# Patient Record
Sex: FEMALE | Race: AMERICAN INDIAN OR ALASKA NATIVE | HISPANIC OR LATINO | Employment: FULL TIME | URBAN - METROPOLITAN AREA
[De-identification: names, ages, dates, MRNs, and addresses within clinical notes are randomized per-mention and may not be internally consistent; named-entity substitution may affect disease eponyms.]

---

## 2017-11-20 DIAGNOSIS — B00.9 RECURRENT HERPES SIMPLEX: ICD-10-CM

## 2017-11-20 NOTE — TELEPHONE ENCOUNTER
According to protocol, not up to date on labs. Routing to provider.,        VALACYCLOVIR HCL 1GM TABS  Will file in chart as: valACYclovir (VALTREX) 1000 mg tablet  TAKE 1 TABLET BY MOUTH THREE TIMES A DAY       Disp: 21 tablet Refills: 1    Class: E-Prescribe Start: 11/20/2017   For: Recurrent herpes simplex  Originally ordered: 6 years ago by Jamison Conn MD  Last refill:3/16/2017  Antivirals for Herpes Protocol [id number] 3:06 PM   Recent or future visit with authorizing provider's specialty    Normal serum creatinine on file in past 12 months    Patient is age 12 or older           Angi Black RN

## 2017-11-21 RX ORDER — VALACYCLOVIR HYDROCHLORIDE 1 G/1
TABLET, FILM COATED ORAL
Qty: 21 TABLET | Refills: 0 | Status: SHIPPED | OUTPATIENT
Start: 2017-11-21 | End: 2017-11-30

## 2017-11-21 NOTE — TELEPHONE ENCOUNTER
Please let Analilia know - she needs an appt before any more refills.  I would suggest making an appt when she is well - either with me or with an adult provider so that she does not need medication urgently.    LUZ MARINA ZARATE MD

## 2017-11-21 NOTE — TELEPHONE ENCOUNTER
LMOM for patient or parent to call back for message from MD regarding a refill request we received from the pharmacy, or to give us permission to leave a detailed message on answering machine.  Conor Prajapati RN

## 2017-11-22 NOTE — TELEPHONE ENCOUNTER
I reached Analilia and relayed the message from Dr Cortes, below.   She says she understands and would like to schedule RHM visit with Dr Cortes and then may discuss transitioning to adult MD.   Appt scheduled for 11/30/2017.    She is a little uncertain about her insurance coverage.  We have Medicaid [place] Restricted.   I asked her to call her insurance to make sure that she is still able to come here and see Dr Cortes.  She says she will do that.    Conor Prajapati RN

## 2017-11-30 ENCOUNTER — TELEPHONE (OUTPATIENT)
Dept: PEDIATRICS | Facility: CLINIC | Age: 22
End: 2017-11-30

## 2017-11-30 ENCOUNTER — OFFICE VISIT (OUTPATIENT)
Dept: PEDIATRICS | Facility: CLINIC | Age: 22
End: 2017-11-30
Payer: MEDICAID

## 2017-11-30 VITALS
DIASTOLIC BLOOD PRESSURE: 65 MMHG | WEIGHT: 138.5 LBS | TEMPERATURE: 97.5 F | HEIGHT: 65 IN | HEART RATE: 72 BPM | BODY MASS INDEX: 23.07 KG/M2 | SYSTOLIC BLOOD PRESSURE: 107 MMHG

## 2017-11-30 DIAGNOSIS — B00.9 RECURRENT HERPES SIMPLEX: ICD-10-CM

## 2017-11-30 DIAGNOSIS — Z00.00 ROUTINE GENERAL MEDICAL EXAMINATION AT A HEALTH CARE FACILITY: Primary | ICD-10-CM

## 2017-11-30 PROCEDURE — 99395 PREV VISIT EST AGE 18-39: CPT | Performed by: PEDIATRICS

## 2017-11-30 RX ORDER — VALACYCLOVIR HYDROCHLORIDE 1 G/1
TABLET, FILM COATED ORAL
Qty: 21 TABLET | Refills: 3 | Status: SHIPPED | OUTPATIENT
Start: 2017-11-30 | End: 2022-05-26

## 2017-11-30 NOTE — PROGRESS NOTES
Cardiac risk assessment:     Family history (males <55, females <65) of angina (chest pain), heart attack, heart surgery for clogged arteries, or stroke: no    Biological parent(s) with a total cholesterol over 240:  no    VISION   No corrective lenses (H Plus Lens Screening required)  Tool used: House  Right eye: 10/50 (20/100)  Left eye: 10/10 (20/20)  Two Line Difference: YES  Visual Acuity: REFER  H Plus Lens Screening: Pass  Vision Assessment: abnormal--refer to eye clinic          HEARING    Right Ear:     1000 Hz:  Conditioning sound at 40 dB:  yes  (Conditioning sound)  1000 Hz: RESPONSE- on Level:   20 db   2000 Hz: RESPONSE- on Level:   20 db   4000 Hz: RESPONSE- on Level:   20 db   6000 Hz: RESPONSE- on Level:   20 db   (11 years and up only)    Left Ear:     6000 Hz: RESPONSE- on Level:   20 db   (11 years and up only)  4000 Hz: RESPONSE- on Level:   20 db   2000 Hz: RESPONSE- on Level:   20 db   1000 Hz: RESPONSE- on Level:   20 db   500 Hz: RESPONSE- on Level:   20 db     Right Ear:  500 Hz: RESPONSE- on Level:   20 db       Question Validity: no  Hearing Assessment: normal    QUESTIONS/CONCERNS: None    MENSTRUAL HISTORY  Normal        ============================================================    PROBLEM LISTPatient Active Problem List   Diagnosis     Atopic dermatitis     Recurrent herpes simplex on low back     MEDICATIONS  Current Outpatient Prescriptions   Medication Sig Dispense Refill     valACYclovir (VALTREX) 1000 mg tablet TAKE 1 TABLET BY MOUTH THREE TIMES A DAY 21 tablet 0     multivitamin, therapeutic with minerals (MULTI-VITAMIN) TABS Take 1 tablet by mouth daily. (Patient not taking: Reported on 11/30/2017) 100 tablet 3      ALLERGY  No Known Allergies    IMMUNIZATIONS  Immunization History   Administered Date(s) Administered     DTAP (<7y) 1997, 1997, 10/09/1998, 08/28/2001     HEPA 06/06/2013     HIB 1995, 1995, 10/19/1998     HPV 09/08/2009, 11/11/2009,  "03/29/2010     HepB 10/19/1998, 01/04/1999, 07/20/1999     Influenza (IIV3) PF 10/23/2008, 11/11/2009, 11/20/2010, 09/26/2011, 09/12/2012     Influenza Vaccine IM 3yrs+ 4 Valent IIV4 10/18/2013     MMR 06/28/1998, 07/31/2002     Mantoux 02/09/1998     Meningococcal (Menactra ) 10/23/2008, 06/06/2013     OPV, trivalent, live 10/19/1998     Poliovirus, inactivated (IPV) 1997, 1997, 08/28/2001     TDAP Vaccine (Boostrix) 10/23/2008     Tetramune (DtP/HIB) 1997     Varicella Immunity: Titer/MD Dx 06/06/2013     Varicella Pt Report Hx of Varicella/Chicken Pox 01/30/1998       HEALTH HISTORY SINCE LAST VISIT  No surgery, major illness or injury since last physical exam    Herpes outbreaks on low back 3-4 times per year.  Improves with valtrex - shorter course and less pain.    DRUGS  Smoking:  no  Passive smoke exposure:  no  Alcohol:  no  Drugs:  no    SEXUALITY  Sexual attraction:  opposite sex  Sexual activity: No      ROS  GENERAL: See health history, nutrition and daily activities   SKIN: No  rash, hives or significant lesions  HEENT: Hearing/vision: see above.  No eye, nasal, ear symptoms.  RESP: No cough or other concerns  CV: No concerns  GI: See nutrition and elimination.  No concerns.  : See elimination. No concerns  NEURO: No headaches or concerns.    OBJECTIVE:   EXAM  /65  Pulse 72  Temp 97.5  F (36.4  C)  Ht 5' 4.65\" (1.642 m)  Wt 138 lb 8 oz (62.8 kg)  LMP 11/24/2017 (Exact Date)  BMI 23.3 kg/m2  Normalized stature-for-age data not available for patients older than 20 years.  Normalized weight-for-age data not available for patients older than 20 years.  Normalized BMI data available only for age 0 to 20 years.  Normalized stature-for-age data not available for patients older than 20 years.  GENERAL: Active, alert, in no acute distress.  SKIN: Clear. No significant rash, abnormal pigmentation or lesions  HEAD: Normocephalic  EYES: Pupils equal, round, reactive, Extraocular " muscles intact. Normal conjunctivae.  EARS: Normal canals. Tympanic membranes are normal; gray and translucent.  NOSE: Normal without discharge.  MOUTH/THROAT: Clear. No oral lesions. Teeth without obvious abnormalities.  NECK: Supple, no masses.  No thyromegaly.  LYMPH NODES: No adenopathy  LUNGS: Clear. No rales, rhonchi, wheezing or retractions  HEART: Regular rhythm. Normal S1/S2. No murmurs. Normal pulses.  ABDOMEN: Soft, non-tender, not distended, no masses or hepatosplenomegaly. Bowel sounds normal.   NEUROLOGIC: No focal findings. Cranial nerves grossly intact: DTR's normal. Normal gait, strength and tone  BACK: Spine is straight, no scoliosis.  EXTREMITIES: Full range of motion, no deformities  : Exam deferred.    ASSESSMENT/PLAN:   (Z00.129) Encounter for routine child health examination without abnormal findings  (primary encounter diagnosis)  Plan: Healthy young adult.  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      (B00.9) Recurrent herpes simplex on low back  Plan: valACYclovir (VALTREX) 1000 mg tablet        Refills given.  If too frequent of breakouts (>4 per year), consider further evaluation.        Anticipatory Guidance  The following topics were discussed:  SOCIAL/ FAMILY:    Parent/ teen communication    TV/ media    School/ homework    Transition to adult care provider  NUTRITION:    Healthy food choices    Weight management  HEALTH / SAFETY:    Adequate sleep/ exercise    Drugs, ETOH, smoking    Body image    Seat belts    Teen   SEXUALITY:    Menstruation    Dating/ relationships    Encourage abstinence    Contraception     Safe sex/ STDs    Preventive Care Plan  Immunizations    Reviewed, up to date - declines flu vaccine.  Referrals/Ongoing Specialty care: No   See other orders in [facility].  Cleared for sports:  Not addressed  BMI at Normalized BMI data available only for age 0 to 20 years.  No weight concerns.  Dyslipidemia risk:    None  Dental visit  recommended: Yes       FOLLOW-UP:    in 1 year for a Preventive Care visit    Resources  HPV and Cancer Prevention:  What Parents Should Know  What Kids Should Know About HPV and Cancer  Goal Tracker: Be More Active  Goal Tracker: Less Screen Time  Goal Tracker: Drink More Water  Goal Tracker: Eat More Fruits and Veggies    LUZ MARINA ZARATE MD  [facility]

## 2017-11-30 NOTE — MR AVS SNAPSHOT
"              After Visit Summary   11/30/2017    Analilia Mora    MRN: [id number]           Patient Information     Date Of Birth          1995        Visit Information        Provider Department      11/30/2017 10:40 AM Manjula Cortes MD [facility]        Today's Diagnoses     Encounter for routine child health examination without abnormal findings    -  1    Recurrent herpes simplex on low back          Care Instructions      Preventive Care at the 15 - 18 Year Visit    Growth Percentiles & Measurements   Weight: 138 lbs 8 oz / 62.8 kg (actual weight) / Normalized weight-for-age data not available for patients older than 20 years.   Length: 5' 4.646\" / 164.2 cm Normalized stature-for-age data not available for patients older than 20 years.   BMI: Body mass index is 23.3 kg/(m^2). Normalized BMI data available only for age 0 to 20 years.   Blood Pressure: Normalized stature-for-age data not available for patients older than 20 years.    Next Visit    Continue to see your health care provider every year for preventive care.    Nutrition    It s very important to eat breakfast. This will help you make it through the morning.    Sit down with your family for a meal on a regular basis.    Eat healthy meals and snacks, including fruits and vegetables. Avoid salty and sugary snack foods.    Be sure to eat foods that are high in calcium and iron.    Avoid or limit caffeine (often found in soda pop).    Sleeping    Your body needs about 9 hours of sleep each night.    Keep screens (TV, computer, and video) out of the bedroom / sleeping area.  They can lead to poor sleep habits and increased obesity.    Health    Limit TV, computer and video time.    Set a goal to be physically fit.  Do some form of exercise every day.  It can be an active sport like skating, running, swimming, a team sport, etc.    Try to get 30 to 60 minutes of exercise at least three times a week.    Make healthy " choices: don t smoke or drink alcohol; don t use drugs.    In your teen years, you can expect . . .    To develop or strengthen hobbies.    To build strong friendships.    To be more responsible for yourself and your actions.    To be more independent.    To set more goals for yourself.    To use words that best express your thoughts and feelings.    To develop self-confidence and a sense of self.    To make choices about your education and future career.    To see big differences in how you and your friends grow and develop.    To have body odor from perspiration (sweating).  Use underarm deodorant each day.    To have some acne, sometimes or all the time.  (Talk with your doctor or nurse about this.)    Most girls have finished going through puberty by 15 to 16 years. Often, boys are still growing and building muscle mass.    Sexuality    It is normal to have sexual feelings.    Find a supportive person who can answer questions about puberty, sexual development, sex, abstinence (choosing not to have sex), sexually transmitted diseases (STDs) and birth control.    Think about how you can say no to sex.    Safety    Accidents are the greatest threat to your health and life.    Avoid dangerous behaviors and situations.  For example, never drive after drinking or using drugs.  Never get in a car if the  has been drinking or using drugs.    Always wear a seat belt in the car.  When you drive, make it a rule for all passengers to wear seat belts, too.    Stay within the speed limit and avoid distractions.    Practice a fire escape plan at home. Check smoke detector batteries twice a year.    Keep electric items (like blow dryers, razors, curling irons, etc.) away from water.    Wear a helmet and other protective gear when bike riding, skating, skateboarding, etc.    Use sunscreen to reduce your risk of skin cancer.    Learn first aid and CPR (cardiopulmonary resuscitation).    Avoid peers who try to pressure you  into risky activities.    Learn skills to manage stress, anger and conflict.    Do not use or carry any kind of weapon.    Find a supportive person (teacher, parent, health provider, counselor) whom you can talk to when you feel sad, angry, lonely or like hurting yourself.    Find help if you are being abused physically or sexually, or if you fear being hurt by others.    As a teenager, you will be given more responsibility for your health and health care decisions.  While your parent or guardian still has an important role, you will likely start spending some time alone with your health care provider as you get older.  Some teen health issues are actually considered confidential, and are protected by law.  Your health care team will discuss this and what it means with you.  Our goal is for you to become comfortable and confident caring for your own health.  ================================================================          Follow-ups after your visit        Who to contact     If you have questions or need follow up information about today's clinic visit or your schedule please contact [facility] directly at [phone].  Normal or non-critical lab and imaging results will be communicated to you by ShareWithUhart, letter or phone within 4 business days after the clinic has received the results. If you do not hear from us within 7 days, please contact the clinic through MyChart or phone. If you have a critical or abnormal lab result, we will notify you by phone as soon as possible.  Submit refill requests through [employer] or call your pharmacy and they will forward the refill request to us. Please allow 3 business days for your refill to be completed.          Additional Information About Your Visit        [employer] Information     [employer] lets you send messages to your doctor, view your test results, renew your prescriptions, schedule appointments and more. To sign up, go to  [email or web address] . Click on \"Log in\" on the left side of the screen, which will take you to the Welcome page. Then click on \"Sign up Now\" on the right side of the page.     You will be asked to enter the access code listed below, as well as some personal information. Please follow the directions to create your username and password.     Your access code is: [email or web address]  Expires: 2018 10:53 AM     Your access code will  in 90 days. If you need help or a new code, please call your [place] clinic or [phone].        Care EveryWhere ID     This is your Care EveryWhere ID. This could be used by other organizations to access your [place] medical records  [id number]        Your Vitals Were     Pulse Temperature Height Last Period BMI (Body Mass Index)       72 97.5  F (36.4  C) 5' 4.65\" (1.642 m) 2017 (Exact Date) 23.3 kg/m2        Blood Pressure from Last 3 Encounters:   17 107/65   16 106/68   02/04/15 129/69    Weight from Last 3 Encounters:   17 138 lb 8 oz (62.8 kg)   16 138 lb (62.6 kg) (69 %)*   02/03/15 130 lb (59 kg) (63 %)*     * Growth percentiles are based on [facility] 2-20 Years data.              Today, you had the following     No orders found for display         Today's Medication Changes          These changes are accurate as of: 17 10:54 AM.  If you have any questions, ask your nurse or doctor.               These medicines have changed or have updated prescriptions.        Dose/Directions    valACYclovir 1000 mg tablet   Commonly known as:  VALTREX   This may have changed:  See the new instructions.   Used for:  Recurrent herpes simplex   Changed by:  Manjula Cortes MD        TAKE 1 TABLET BY MOUTH THREE TIMES A DAY   Quantity:  21 tablet   Refills:  3            Where to get your medicines      These medications were sent to [facility] [place], [place] - [address] [place] [address] [address]  [address] [place] [address] [address] [facility] " [id number]     Phone:  [phone]     valACYclovir 1000 mg tablet                Primary Care Provider Office Phone # Fax #    Manjula Cortes [phone] [address] [facility] [address]        Equal Access to Services     OSORIO ROSIE : Hadii aad ku hadasho Soomaali, waaxda luqadaha, qaybta kaalmada adeegyada, waxay dungin hayalexisn adeangela garza smooth squires. So [facility] [phone].    ATENCIÓN: Si habla español, tiene a king disposición servicios gratuitos de asistencia lingüística. Llame al [phone].    We comply with applicable federal civil rights laws and [place] laws. We do not discriminate on the basis of race, color, national origin, age, disability, sex, sexual orientation, or gender identity.            Thank you!     Thank you for choosing [facility]  for your care. Our goal is always to provide you with excellent care. Hearing back from our patients is one way we can continue to improve our services. Please take a few minutes to complete the written survey that you may receive in the mail after your visit with us. Thank you!             Your Updated Medication List - Protect others around you: Learn how to safely use, store and throw away your medicines at www.disposemymeds.org.          This list is accurate as of: 11/30/17 10:54 AM.  Always use your most recent med list.                   Brand Name Dispense Instructions for use Diagnosis    multivitamin, therapeutic with minerals Tabs tablet     100 tablet    Take 1 tablet by mouth daily.    Routine infant or child health check       valACYclovir 1000 mg tablet    VALTREX    21 tablet    TAKE 1 TABLET BY MOUTH THREE TIMES A DAY    Recurrent herpes simplex

## 2017-11-30 NOTE — PATIENT INSTRUCTIONS
"  Preventive Care at the 15 - 18 Year Visit    Growth Percentiles & Measurements   Weight: 138 lbs 8 oz / 62.8 kg (actual weight) / Normalized weight-for-age data not available for patients older than 20 years.   Length: 5' 4.646\" / 164.2 cm Normalized stature-for-age data not available for patients older than 20 years.   BMI: Body mass index is 23.3 kg/(m^2). Normalized BMI data available only for age 0 to 20 years.   Blood Pressure: Normalized stature-for-age data not available for patients older than 20 years.    Next Visit    Continue to see your health care provider every year for preventive care.    Nutrition    It s very important to eat breakfast. This will help you make it through the morning.    Sit down with your family for a meal on a regular basis.    Eat healthy meals and snacks, including fruits and vegetables. Avoid salty and sugary snack foods.    Be sure to eat foods that are high in calcium and iron.    Avoid or limit caffeine (often found in soda pop).    Sleeping    Your body needs about 9 hours of sleep each night.    Keep screens (TV, computer, and video) out of the bedroom / sleeping area.  They can lead to poor sleep habits and increased obesity.    Health    Limit TV, computer and video time.    Set a goal to be physically fit.  Do some form of exercise every day.  It can be an active sport like skating, running, swimming, a team sport, etc.    Try to get 30 to 60 minutes of exercise at least three times a week.    Make healthy choices: don t smoke or drink alcohol; don t use drugs.    In your teen years, you can expect . . .    To develop or strengthen hobbies.    To build strong friendships.    To be more responsible for yourself and your actions.    To be more independent.    To set more goals for yourself.    To use words that best express your thoughts and feelings.    To develop self-confidence and a sense of self.    To make choices about your education and future career.    To see " big differences in how you and your friends grow and develop.    To have body odor from perspiration (sweating).  Use underarm deodorant each day.    To have some acne, sometimes or all the time.  (Talk with your doctor or nurse about this.)    Most girls have finished going through puberty by 15 to 16 years. Often, boys are still growing and building muscle mass.    Sexuality    It is normal to have sexual feelings.    Find a supportive person who can answer questions about puberty, sexual development, sex, abstinence (choosing not to have sex), sexually transmitted diseases (STDs) and birth control.    Think about how you can say no to sex.    Safety    Accidents are the greatest threat to your health and life.    Avoid dangerous behaviors and situations.  For example, never drive after drinking or using drugs.  Never get in a car if the  has been drinking or using drugs.    Always wear a seat belt in the car.  When you drive, make it a rule for all passengers to wear seat belts, too.    Stay within the speed limit and avoid distractions.    Practice a fire escape plan at home. Check smoke detector batteries twice a year.    Keep electric items (like blow dryers, razors, curling irons, etc.) away from water.    Wear a helmet and other protective gear when bike riding, skating, skateboarding, etc.    Use sunscreen to reduce your risk of skin cancer.    Learn first aid and CPR (cardiopulmonary resuscitation).    Avoid peers who try to pressure you into risky activities.    Learn skills to manage stress, anger and conflict.    Do not use or carry any kind of weapon.    Find a supportive person (teacher, parent, health provider, counselor) whom you can talk to when you feel sad, angry, lonely or like hurting yourself.    Find help if you are being abused physically or sexually, or if you fear being hurt by others.    As a teenager, you will be given more responsibility for your health and health care decisions.   While your parent or guardian still has an important role, you will likely start spending some time alone with your health care provider as you get older.  Some teen health issues are actually considered confidential, and are protected by law.  Your health care team will discuss this and what it means with you.  Our goal is for you to become comfortable and confident caring for your own health.  ================================================================

## 2018-01-05 ENCOUNTER — OFFICE VISIT (OUTPATIENT)
Dept: OPHTHALMOLOGY | Facility: CLINIC | Age: 23
End: 2018-01-05
Payer: COMMERCIAL

## 2018-01-05 DIAGNOSIS — H52.13 MYOPIA OF BOTH EYES: Primary | ICD-10-CM

## 2018-01-05 ASSESSMENT — CONF VISUAL FIELD
OD_NORMAL: 1
OS_NORMAL: 1
METHOD: COUNTING FINGERS

## 2018-01-05 ASSESSMENT — VISUAL ACUITY
OD_SC: 20/200
OD_PH_SC: 20/40-
OS_SC+: +2
OS_SC: J1+
METHOD: SNELLEN - LINEAR
OS_SC: 20/30
OD_SC: J1+

## 2018-01-05 ASSESSMENT — SLIT LAMP EXAM - LIDS
COMMENTS: NORMAL
COMMENTS: NORMAL

## 2018-01-05 ASSESSMENT — REFRACTION
OS_AXIS: 135
OD_SPHERE: -1.75
OS_SPHERE: -0.75
OS_SPHERE: -0.50
OS_CYLINDER: +0.25
OD_CYLINDER: SPHERE
OD_SPHERE: -1.75
OD_CYLINDER: +0.50
OS_CYLINDER: +0.75
OS_AXIS: 100
OD_AXIS: 090

## 2018-01-05 ASSESSMENT — EXTERNAL EXAM - LEFT EYE: OS_EXAM: NORMAL

## 2018-01-05 ASSESSMENT — CUP TO DISC RATIO
OS_RATIO: 0.5
OD_RATIO: 0.5

## 2018-01-05 ASSESSMENT — TONOMETRY
OD_IOP_MMHG: 14
IOP_METHOD: ICARE
OS_IOP_MMHG: 15

## 2018-01-05 ASSESSMENT — REFRACTION_MANIFEST
OD_CYLINDER: SPHERE
OS_SPHERE: -1.00
OD_SPHERE: -1.75
OS_CYLINDER: SPHERE

## 2018-01-05 ASSESSMENT — EXTERNAL EXAM - RIGHT EYE: OD_EXAM: NORMAL

## 2018-01-05 NOTE — PROGRESS NOTES
Assessment/Plan  (H52.13) Myopia of both eyes  (primary encounter diagnosis)  Comment: Myopia OU  Plan: REFRACTION [[id number]]         Educated patient on condition and clinical findings. Dispensed spectacle prescription for full time wear. Educated patient on possibility of adaptation period, if symptoms do not improve return to clinic for further testing.   Discussed lenses at length given this is a first time glasses prescription.    Return to clinic in 1 year for comprehensive eye exam.    Complete documentation of historical and exam elements from today's encounter can  be found in the full encounter summary report (not reduplicated in this progress  note). I personally obtained the chief complaint(s) and history of present illness. I  confirmed and edited as necessary the review of systems, past medical/surgical  history, family history, social history, and examination findings as documented by  others; and I examined the patient myself. I personally reviewed the relevant tests,  images, and reports as documented above. I formulated and edited as necessary the  assessment and plan and discussed the findings and management plan with the  patient and family.    Daniel Beckford, OD, FAAO

## 2018-01-05 NOTE — MR AVS SNAPSHOT
After Visit Summary   2018    Analilia Mora    MRN: [id number]           Patient Information     Date Of Birth          1995        Visit Information        Provider Department      2018 8:00 AM Daniel Beckford, [facility]        Today's Diagnoses     Myopia of both eyes    -  1       Follow-ups after your visit        Follow-up notes from your care team     Return in about 1 year (around 2019) for Comprehensive Eye Exam.      Who to contact     Please call your clinic at [phone] to:    Ask questions about your health    Make or cancel appointments    Discuss your medicines    Learn about your test results    Speak to your doctor   If you have compliments or concerns about an experience at your clinic, or if you wish to file a complaint, please contact [facility] Patient Relations at [phone] or email us at [email or web address]         Additional Information About Your Visit        MyChart Information     [employer] is an electronic gateway that provides easy, online access to your medical records. With [employer], you can request a clinic appointment, read your test results, renew a prescription or communicate with your care team.     To sign up for [employer] visit the website at [email or web address]   You will be asked to enter the access code listed below, as well as some personal information. Please follow the directions to create your username and password.     Your access code is: [email or web address]  Expires: 2018 10:53 AM     Your access code will  in 90 days. If you need help or a new code, please contact your [facility] or call [phone] for assistance.        Care EveryWhere ID     This is your Care EveryWhere ID. This could be used by other organizations to access your [place] medical records  [id number]         Blood Pressure from Last 3 Encounters:   17  107/65   08/03/16 106/68   02/04/15 129/69    Weight from Last 3 Encounters:   11/30/17 62.8 kg (138 lb 8 oz)   08/03/16 62.6 kg (138 lb) (69 %)*   02/03/15 59 kg (130 lb) (63 %)*     * Growth percentiles are based on [facility] 2-20 Years data.              We Performed the Following     REFRACTION [[id number]]        Primary Care Provider Office Phone # Fax #    Manjula Cortes [phone] [address] [facility] [address]        Equal Access to Services     [facility]: Hadii aad ku hadasho Soomaali, waaxda luqadaha, qaybta kaalmada adeangelayada, lilian rebollar . So [facility] [phone].    ATENCIÓN: Si habla español, tiene a king disposición servicios gratuitos de asistencia lingüística. Llame al [phone].    We comply with applicable federal civil rights laws and [place] laws. We do not discriminate on the basis of race, color, national origin, age, disability, sex, sexual orientation, or gender identity.            Thank you!     Thank you for choosing [facility]  for your care. Our goal is always to provide you with excellent care. Hearing back from our patients is one way we can continue to improve our services. Please take a few minutes to complete the written survey that you may receive in the mail after your visit with us. Thank you!             Your Updated Medication List - Protect others around you: Learn how to safely use, store and throw away your medicines at www.disposemymeds.org.          This list is accurate as of: 1/5/18  9:42 AM.  Always use your most recent med list.                   Brand Name Dispense Instructions for use Diagnosis    multivitamin, therapeutic with minerals Tabs tablet     100 tablet    Take 1 tablet by mouth daily.    Routine infant or child health check       valACYclovir 1000 mg tablet    VALTREX    21 tablet    TAKE 1 TABLET BY MOUTH THREE TIMES A DAY    Recurrent herpes simplex

## 2018-01-05 NOTE — NURSING NOTE
Chief Complaints and History of Present Illnesses   Patient presents with     Annual Eye Exam     HPI    Affected eye(s):  Right   Symptoms:     Blurred vision      Frequency:  Constant       Do you have eye pain now?:  No      Comments:  Right eye blurry for a year. No glasses before. No eye pain, no eye drops.    Leeanne PRICE 7:24 AM January 5, 2018

## 2018-01-12 DIAGNOSIS — B00.9 RECURRENT HERPES SIMPLEX: ICD-10-CM

## 2018-01-12 NOTE — TELEPHONE ENCOUNTER
"Requested Prescriptions   Pending Prescriptions Disp Refills     valACYclovir (VALTREX) 1000 mg tablet [Pharmacy Med Name: VALACYCLOVIR HCL 1GM TABS]  Last Written Prescription Date: 1/30/17  Last Fill Quantity: 21 tablet,  # refills: 3   Last Office Visit with G, P or [facility] prescribing provider:  11/30/17   Future Office Visit:      21 tablet 0     Sig: TAKE 1 TABLET BY MOUTH THREE TIMES DAILY    Antivirals for Herpes Protocol Failed    1/12/2018 11:01 AM       Failed - Normal serum creatinine on file in past 12 months    No lab results found.         Passed - Patient is age 12 or older       Passed - Recent or future visit with authorizing provider's specialty    Patient had office visit in the last year or has a visit in the next 30 days with authorizing provider.  See \"Patient Info\" tab in inbasket, or \"Choose Columns\" in Meds & Orders section of the refill encounter.                   "

## 2018-01-13 NOTE — TELEPHONE ENCOUNTER
Unable to refill per RN protocols, missing information (creatinine). Routing to DAVONTE. Analilia was seen on 11/30/17 with Dr. Cortes.    Yesenia Tabares RN

## 2018-01-14 DIAGNOSIS — B00.9 RECURRENT HERPES SIMPLEX: ICD-10-CM

## 2018-01-14 RX ORDER — VALACYCLOVIR HYDROCHLORIDE 1 G/1
TABLET, FILM COATED ORAL
Qty: 21 TABLET | Refills: 0 | Status: SHIPPED | OUTPATIENT
Start: 2018-01-14 | End: 2022-05-26

## 2018-08-04 ENCOUNTER — HEALTH MAINTENANCE LETTER (OUTPATIENT)
Age: 23
End: 2018-08-04

## 2018-10-13 ENCOUNTER — HEALTH MAINTENANCE LETTER (OUTPATIENT)
Age: 23
End: 2018-10-13

## 2018-11-10 ENCOUNTER — HEALTH MAINTENANCE LETTER (OUTPATIENT)
Age: 23
End: 2018-11-10

## 2019-01-08 ENCOUNTER — OFFICE VISIT (OUTPATIENT)
Dept: OPHTHALMOLOGY | Facility: CLINIC | Age: 24
End: 2019-01-08
Payer: COMMERCIAL

## 2019-01-08 DIAGNOSIS — H52.13 MYOPIA OF BOTH EYES: Primary | ICD-10-CM

## 2019-01-08 ASSESSMENT — TONOMETRY
OD_IOP_MMHG: 12
IOP_METHOD: ICARE
OS_IOP_MMHG: 10

## 2019-01-08 ASSESSMENT — CONF VISUAL FIELD
OS_NORMAL: 1
METHOD: COUNTING FINGERS
OD_NORMAL: 1

## 2019-01-08 ASSESSMENT — VISUAL ACUITY
OS_CC: 20/20
OD_CC+: -
CORRECTION_TYPE: GLASSES
METHOD: SNELLEN - LINEAR
OD_CC: 20/25
OS_CC+: -

## 2019-01-08 ASSESSMENT — REFRACTION_MANIFEST
OS_CYLINDER: +0.25
OS_SPHERE: -1.25
OD_CYLINDER: SPHERE
OS_AXIS: 101
OD_SPHERE: -2.25

## 2019-01-08 ASSESSMENT — REFRACTION_WEARINGRX
OS_SPHERE: -0.50
OD_SPHERE: -1.75
OD_CYLINDER: SPHERE
OS_CYLINDER: +0.25
OS_AXIS: 100

## 2019-01-08 ASSESSMENT — SLIT LAMP EXAM - LIDS
COMMENTS: NORMAL
COMMENTS: NORMAL

## 2019-01-08 ASSESSMENT — CUP TO DISC RATIO
OS_RATIO: 0.5
OD_RATIO: 0.5

## 2019-01-08 ASSESSMENT — REFRACTION
OD_SPHERE: -1.75
OD_CYLINDER: SPHERE
OS_CYLINDER: +0.25
OS_SPHERE: -0.75
OS_AXIS: 101

## 2019-01-08 ASSESSMENT — EXTERNAL EXAM - LEFT EYE: OS_EXAM: NORMAL

## 2019-01-08 ASSESSMENT — EXTERNAL EXAM - RIGHT EYE: OD_EXAM: NORMAL

## 2019-01-08 NOTE — NURSING NOTE
Chief Complaints and History of Present Illnesses   Patient presents with     Annual Eye Exam     Chief Complaint(s) and History of Present Illness(es)     Annual Eye Exam     Laterality: both eyes    Onset: years ago    Frequency: constantly    Timing: throughout the day    Course: stable    Associated symptoms: Negative for eye pain    Treatments tried: no treatments    Pain scale: 0/10              Comments     Patient states vision has been stable since last eye exam, both eyes. Denies eye pain or irritation. Does not take eye drops.    Leeanne Barfield COT 1:01 PM January 8, 2019

## 2019-01-08 NOTE — PROGRESS NOTES
History  HPI     Annual Eye Exam     In both eyes.  This started years ago.  Occurring constantly.  It is worse throughout the day.  Since onset it is stable.  Associated symptoms include Negative for eye pain.  Treatments tried include no treatments.  Pain was noted as 0/10.              Comments     Patient states vision has been stable since last eye exam, both eyes. Denies eye pain or irritation. Does not take eye drops.    Leeanne Barfield COT 1:01 PM January 8, 2019             Last edited by Leeanne Barfield on 1/8/2019  1:01 PM. (History)          Assessment/Plan  (H52.13) Myopia of both eyes  (primary encounter diagnosis)  Comment: Myopia both eyes, small change in refraction  Plan: REFRACTION   Educated patient on condition and clinical findings. Dispensed spectacle prescription for full time wear. Educated patient on possibility of adaptation period, if symptoms do not improve return to clinic for further testing.    Return to clinic in 1 year for comprehensive eye exam.    Complete documentation of historical and exam elements from today's encounter can  be found in the full encounter summary report (not reduplicated in this progress  note). I personally obtained the chief complaint(s) and history of present illness. I  confirmed and edited as necessary the review of systems, past medical/surgical  history, family history, social history, and examination findings as documented by  others; and I examined the patient myself. I personally reviewed the relevant tests,  images, and reports as documented above. I formulated and edited as necessary the  assessment and plan and discussed the findings and management plan with the  patient and family.    Daniel Beckford, OD, FAAO

## 2019-01-16 ENCOUNTER — TELEPHONE (OUTPATIENT)
Dept: OPHTHALMOLOGY | Facility: CLINIC | Age: 24
End: 2019-01-16

## 2020-01-06 ENCOUNTER — TELEPHONE (OUTPATIENT)
Dept: OPHTHALMOLOGY | Facility: CLINIC | Age: 25
End: 2020-01-06

## 2020-01-19 DIAGNOSIS — B00.9 RECURRENT HERPES SIMPLEX: ICD-10-CM

## 2020-01-20 RX ORDER — VALACYCLOVIR HYDROCHLORIDE 1 G/1
TABLET, FILM COATED ORAL
Qty: 21 TABLET | Refills: 3 | OUTPATIENT
Start: 2020-01-20

## 2020-02-18 ENCOUNTER — TELEPHONE (OUTPATIENT)
Dept: OPHTHALMOLOGY | Facility: CLINIC | Age: 25
End: 2020-02-18

## 2020-02-19 ENCOUNTER — OFFICE VISIT (OUTPATIENT)
Dept: OPHTHALMOLOGY | Facility: CLINIC | Age: 25
End: 2020-02-19
Payer: COMMERCIAL

## 2020-02-19 DIAGNOSIS — H35.411 LATTICE DEGENERATION, RIGHT EYE: ICD-10-CM

## 2020-02-19 DIAGNOSIS — H52.13 MYOPIA OF BOTH EYES: Primary | ICD-10-CM

## 2020-02-19 ASSESSMENT — VISUAL ACUITY
CORRECTION_TYPE: GLASSES
OS_CC: 20/25
METHOD: SNELLEN - LINEAR
OD_CC: 20/30

## 2020-02-19 ASSESSMENT — CUP TO DISC RATIO
OD_RATIO: 0.5
OS_RATIO: 0.5

## 2020-02-19 ASSESSMENT — REFRACTION_WEARINGRX
OS_AXIS: 100
SPECS_TYPE: SVL
OS_CYLINDER: +0.25
OD_SPHERE: -2.00
OD_CYLINDER: SPHERE
OS_SPHERE: -0.75

## 2020-02-19 ASSESSMENT — REFRACTION
OD_SPHERE: +2.75
OS_SPHERE: +0.75
OD_CYLINDER: SPHERE
OS_CYLINDER: SPHERE

## 2020-02-19 ASSESSMENT — EXTERNAL EXAM - LEFT EYE: OS_EXAM: NORMAL

## 2020-02-19 ASSESSMENT — REFRACTION_MANIFEST
OD_CYLINDER: SPHERE
OD_SPHERE: -2.75
OS_CYLINDER: SPHERE
OS_SPHERE: -0.75

## 2020-02-19 ASSESSMENT — TONOMETRY
IOP_METHOD: ICARE
OS_IOP_MMHG: 15
OD_IOP_MMHG: 16

## 2020-02-19 ASSESSMENT — EXTERNAL EXAM - RIGHT EYE: OD_EXAM: NORMAL

## 2020-02-19 ASSESSMENT — CONF VISUAL FIELD
OS_NORMAL: 1
OD_NORMAL: 1
METHOD: COUNTING FINGERS

## 2020-02-19 ASSESSMENT — SLIT LAMP EXAM - LIDS
COMMENTS: NORMAL
COMMENTS: NORMAL

## 2020-02-19 NOTE — NURSING NOTE
Chief Complaints and History of Present Illnesses   Patient presents with     COMPREHENSIVE EYE EXAM     Chief Complaint(s) and History of Present Illness(es)     COMPREHENSIVE EYE EXAM     Laterality: both eyes    Associated symptoms: Negative for eye pain, dryness, tearing, flashes and floaters              Comments     Patient notes that she is here for full annual eye check, she has no concerns today, seeing well out of gls.     Kimberly PRICE February 19, 2020 3:25 PM

## 2020-02-19 NOTE — PROGRESS NOTES
History  HPI     COMPREHENSIVE EYE EXAM     In both eyes.  Associated symptoms include Negative for eye pain, dryness, tearing, flashes and floaters.              Comments     Patient notes that she is here for full annual eye check, she has no concerns today, seeing well out of gls.     Kimberly Almazan COT February 19, 2020 3:25 PM            Last edited by Adenike Almazan on 2/19/2020  3:25 PM. (History)          Assessment/Plan  (H52.13) Myopia of both eyes  (primary encounter diagnosis)  Comment: Myopia both eyes   Plan: REFRACTION         Educated patient on condition and clinical findings. Dispensed spectacle prescription for full time wear. Educated patient on possibility of adaptation period, if symptoms do not improve return to clinic for further testing.    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 If symptoms present, return to clinic immediately.    Return to clinic in 1 year for comprehensive eye exam.    Complete documentation of historical and exam elements from today's encounter can  be found in the full encounter summary report (not reduplicated in this progress  note). I personally obtained the chief complaint(s) and history of present illness. I  confirmed and edited as necessary the review of systems, past medical/surgical  history, family history, social history, and examination findings as documented by  others; and I examined the patient myself. I personally reviewed the relevant tests,  images, and reports as documented above. I formulated and edited as necessary the  assessment and plan and discussed the findings and management plan with the  patient and family.    Daniel Beckford, OD, FAAO

## 2021-02-23 ENCOUNTER — OFFICE VISIT (OUTPATIENT)
Dept: OPHTHALMOLOGY | Facility: CLINIC | Age: 26
End: 2021-02-23
Payer: COMMERCIAL

## 2021-02-23 DIAGNOSIS — H52.13 MYOPIA OF BOTH EYES: Primary | ICD-10-CM

## 2021-02-23 DIAGNOSIS — H35.411 LATTICE DEGENERATION, RIGHT EYE: ICD-10-CM

## 2021-02-23 PROCEDURE — 92015 DETERMINE REFRACTIVE STATE: CPT | Performed by: OPTOMETRIST

## 2021-02-23 PROCEDURE — 92014 COMPRE OPH EXAM EST PT 1/>: CPT | Performed by: OPTOMETRIST

## 2021-02-23 ASSESSMENT — EXTERNAL EXAM - RIGHT EYE: OD_EXAM: NORMAL

## 2021-02-23 ASSESSMENT — REFRACTION_MANIFEST
OD_SPHERE: -2.75
OS_CYLINDER: SPHERE
OD_CYLINDER: SPHERE
OS_SPHERE: -0.75

## 2021-02-23 ASSESSMENT — REFRACTION_WEARINGRX
SPECS_TYPE: SVL
OD_CYLINDER: SPHERE
OD_SPHERE: -2.75
OS_SPHERE: -0.75
OS_CYLINDER: SPHERE

## 2021-02-23 ASSESSMENT — CUP TO DISC RATIO
OD_RATIO: 0.5
OS_RATIO: 0.5

## 2021-02-23 ASSESSMENT — TONOMETRY
OS_IOP_MMHG: 14
IOP_METHOD: ICARE
OD_IOP_MMHG: 16

## 2021-02-23 ASSESSMENT — VISUAL ACUITY
OS_CC: 20/25
METHOD: SNELLEN - LINEAR
OS_CC+: -1
CORRECTION_TYPE: GLASSES
OD_CC: 20/20

## 2021-02-23 ASSESSMENT — SLIT LAMP EXAM - LIDS
COMMENTS: NORMAL
COMMENTS: NORMAL

## 2021-02-23 ASSESSMENT — EXTERNAL EXAM - LEFT EYE: OS_EXAM: NORMAL

## 2021-02-23 ASSESSMENT — CONF VISUAL FIELD
OD_NORMAL: 1
OS_NORMAL: 1

## 2021-02-23 NOTE — PROGRESS NOTES
History  HPI     COMPREHENSIVE EYE EXAM     In both eyes.  Associated symptoms include floaters (comes and goes per pt).  Negative for dryness, eye pain, flashes and tearing.  Treatments tried include no treatments.  Pain was noted as 0/10.              Comments     Pt notes gls are working well, here for annual check up, no concerns today.    Kimberly PRICE February 23, 2021 3:01 PM            Last edited by Adenike Almazan on 2/23/2021  3:01 PM. (History)          Assessment/Plan  (H52.13) Myopia of both eyes  (primary encounter diagnosis)  Comment: Myopia both eyes  Plan: REFRACTION         Educated patient on condition and clinical findings. Dispensed spectacle prescription for full time wear. Monitor annually.    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 If symptoms present, return to clinic immediately.    Return to clinic in 1 year for comprehensive eye exam.    Complete documentation of historical and exam elements from today's encounter can  be found in the full encounter summary report (not reduplicated in this progress  note). I personally obtained the chief complaint(s) and history of present illness. I  confirmed and edited as necessary the review of systems, past medical/surgical  history, family history, social history, and examination findings as documented by  others; and I examined the patient myself. I personally reviewed the relevant tests,  images, and reports as documented above. I formulated and edited as necessary the  assessment and plan and discussed the findings and management plan with the  patient and family.    Daniel Beckford, OD, FAAO

## 2021-02-23 NOTE — NURSING NOTE
Chief Complaints and History of Present Illnesses   Patient presents with     COMPREHENSIVE EYE EXAM     Chief Complaint(s) and History of Present Illness(es)     COMPREHENSIVE EYE EXAM     Laterality: both eyes    Associated symptoms: floaters (comes and goes per pt).  Negative for dryness, eye pain, flashes and tearing    Treatments tried: no treatments    Pain scale: 0/10              Comments     Pt notes gls are working well, here for annual check up, no concerns today.    Kimberly PRICE February 23, 2021 3:01 PM

## 2021-03-29 NOTE — TELEPHONE ENCOUNTER
Please let Analilia know - I forgot to mention at today's visit that she did not pass her vision screen.  I would recommend evaluation at an eye clinic.    LUZ MARINA ZARATE MD         Spironolactone Counseling: Patient advised regarding risks of diarrhea, abdominal pain, hyperkalemia, birth defects (for female patients), liver toxicity and renal toxicity. The patient may need blood work to monitor liver and kidney function and potassium levels while on therapy. The patient verbalized understanding of the proper use and possible adverse effects of spironolactone.  All of the patient's questions and concerns were addressed.

## 2021-04-27 ENCOUNTER — VIRTUAL VISIT (OUTPATIENT)
Dept: INTERNAL MEDICINE | Facility: CLINIC | Age: 26
End: 2021-04-27
Payer: COMMERCIAL

## 2021-04-27 DIAGNOSIS — R50.9 FEVER AND CHILLS: Primary | ICD-10-CM

## 2021-04-27 DIAGNOSIS — R50.9 FEVER AND CHILLS: ICD-10-CM

## 2021-04-27 LAB
SARS-COV-2 RNA RESP QL NAA+PROBE: NORMAL
SPECIMEN SOURCE: NORMAL

## 2021-04-27 PROCEDURE — 99202 OFFICE O/P NEW SF 15 MIN: CPT | Mod: 95 | Performed by: NURSE PRACTITIONER

## 2021-04-27 PROCEDURE — U0003 INFECTIOUS AGENT DETECTION BY NUCLEIC ACID (DNA OR RNA); SEVERE ACUTE RESPIRATORY SYNDROME CORONAVIRUS 2 (SARS-COV-2) (CORONAVIRUS DISEASE [COVID-19]), AMPLIFIED PROBE TECHNIQUE, MAKING USE OF HIGH THROUGHPUT TECHNOLOGIES AS DESCRIBED BY CMS-2020-01-R: HCPCS | Performed by: NURSE PRACTITIONER

## 2021-04-27 PROCEDURE — U0005 INFEC AGEN DETEC AMPLI PROBE: HCPCS | Performed by: NURSE PRACTITIONER

## 2021-04-27 NOTE — NURSING NOTE
Chief Complaint   Patient presents with     Covid Concern     pt would like to discuss covid, headache, runny nose, loss of smell and taste since last thursday       Chayo Wooten CMA, EMT at 8:00 AM on 4/27/2021.

## 2021-04-27 NOTE — PROGRESS NOTES
Analilia is a 25 year old who is being evaluated via a billable video visit.      How would you like to obtain your AVS? MyChart  If the video visit is dropped, the invitation should be resent by: Text to cell phone: [phone]  Will anyone else be joining your video visit? No      Video Start Time:8:02      Subjective   Analilia is a 25 year old who presents for the following health issues : would like a Covid test.    HPI   Last Thursday 4/22/21 onset of chills, myalgias, fever, wekness. Now continues to have weakness and fatigue , headache, rhinorrhea, SOB with exertion and loss of taste and smell. She states members in her family have been illwith covid and she has been exposed to them.     Patient Active Problem List   Diagnosis     Atopic dermatitis     Recurrent herpes simplex on low back       Review of Systems   See above.      Objective       Vitals: No vitals were obtained today due to virtual visit.    Physical Exam   GENERAL: Healthy, alert and no distress  EYES: Eyes grossly normal to inspection.  No discharge or erythema, or obvious scleral/conjunctival abnormalities.  RESP: No audible wheeze, cough, or visible cyanosis.  No visible retractions or increased work of breathing.    SKIN: Visible skin clear. No significant rash, abnormal pigmentation or lesions.  NEURO:  Mentation and speech appropriate for age.  PSYCH: Mentation appears normal, affect normal/bright, judgement and insight intact, normal speech and appearance well-groomed.    Analilia was seen today for covid concern.    Diagnoses and all orders for this visit:    Fever and chills  -     Symptomatic COVID-19 Virus (Coronavirus) by PCR; Future    I explained that the lab would call her to schedule a covid 19 PCR test today.        Video-Visit Details    Type of service:  Video Visit    Video End Time: 8:06    Originating Location (pt. Location): Home    Distant Location (provider location):  [facility]  [place]     Platform used for Video Visit: Wilber    Total time spent today with this patient including chart review, exam time with patient and documentation : 19 minutes.    Shante MCKEON, CNP

## 2021-04-28 LAB
LABORATORY COMMENT REPORT: ABNORMAL
SARS-COV-2 RNA RESP QL NAA+PROBE: POSITIVE
SPECIMEN SOURCE: ABNORMAL

## 2021-05-15 ENCOUNTER — HEALTH MAINTENANCE LETTER (OUTPATIENT)
Age: 26
End: 2021-05-15

## 2021-07-13 ENCOUNTER — IMMUNIZATION (OUTPATIENT)
Dept: PEDIATRICS | Facility: CLINIC | Age: 26
End: 2021-07-13
Attending: FAMILY MEDICINE
Payer: COMMERCIAL

## 2021-07-13 PROCEDURE — 91300 PR COVID VAC PFIZER DIL RECON 30 MCG/0.3 ML IM: CPT

## 2021-07-13 PROCEDURE — 0002A PR COVID VAC PFIZER DIL RECON 30 MCG/0.3 ML IM: CPT

## 2021-08-10 NOTE — TELEPHONE ENCOUNTER
"Requested Prescriptions   Pending Prescriptions Disp Refills     valACYclovir (VALTREX) 1000 mg tablet [Pharmacy Med Name: VALACYCLOVIR HCL 1GM TABS] 21 tablet 3     Sig: TAKE 1 TABLET BY MOUTH THREE TIMES DAILY  Last Written Prescription Date:  01/14/18  Last Fill Quantity: 21 tablet,  # refills: 0   Last office visit: 11/30/2017 with prescribing provider:  Dr. Cortes   Future Office Visit:           Antivirals for Herpes Protocol Failed - 1/19/2020 12:13 PM        Failed - Recent (12 mo) or future (30 days) visit within the authorizing provider's specialty     Patient has had an office visit with the authorizing provider or a provider within the authorizing providers department within the previous 12 mos or has a future within next 30 days. See \"Patient Info\" tab in inbasket, or \"Choose Columns\" in Meds & Orders section of the refill encounter.              Failed - Normal serum creatinine on file in past 12 months     No lab results found.          Passed - Patient is age 12 or older        Passed - Medication is active on med list          " Subjective  Wellington Lopez, 63 y.o. female presents today with:  Chief Complaint   Patient presents with    Nasal Congestion     since yesterady    Cough     SOB w cough, cough getting worse    Chills     since Sunday, traveled last monday through friday out of state        Cough  This is a new problem. The current episode started yesterday (ongoing dry cough over a month). The problem has been rapidly worsening. The problem occurs constantly. The cough is productive of sputum. Associated symptoms include chills, ear congestion, nasal congestion, postnasal drip, rhinorrhea, a sore throat, shortness of breath (with cough) and sweats. Pertinent negatives include no chest pain, ear pain, fever, headaches, heartburn, hemoptysis, myalgias, rash, weight loss or wheezing. Nothing aggravates the symptoms. She has tried nothing for the symptoms. Her past medical history is significant for bronchitis and environmental allergies. There is no history of asthma, bronchiectasis, COPD, emphysema or pneumonia. Review of Systems   Constitutional: Positive for chills and fatigue. Negative for activity change, appetite change, diaphoresis, fever and weight loss. HENT: Positive for congestion, postnasal drip, rhinorrhea, sinus pressure and sore throat. Negative for ear pain, sinus pain and trouble swallowing. Eyes: Negative for visual disturbance. Respiratory: Positive for cough, chest tightness and shortness of breath (with cough). Negative for hemoptysis and wheezing. Cardiovascular: Negative for chest pain and palpitations. Gastrointestinal: Negative for abdominal distention, abdominal pain, constipation, diarrhea, heartburn, nausea and vomiting. Genitourinary: Negative for difficulty urinating, dysuria, enuresis, flank pain, frequency and urgency. Musculoskeletal: Negative for arthralgias, back pain, myalgias, neck pain and neck stiffness. Skin: Negative for color change and rash.    Allergic/Immunologic: Positive for environmental allergies. Neurological: Negative for dizziness, tremors, seizures, syncope, speech difficulty, weakness, light-headedness, numbness and headaches.        Past Medical History:   Diagnosis Date    Abnormal mammogram 11/3/2015    Abnormal mammogram of right breast 1/1/2017    Borderline hyperlipidemia     Coronary artery disease involving native coronary artery of native heart without angina pectoris 10/17/2018    CVA (cerebral vascular accident) ([address] 5/2016    Dr. Waleska Alves Degenerative disc disease, lumbar     Difficult intubation     use pediatric tube    Duodenal ulcer without hemorrhage or perforation 06/01/2017    Dr. Renetta Hernandez, avoid NSAIDs and continue protonix    Edema     Fatigue     ASHLEY (generalized anxiety disorder)     GERD (gastroesophageal reflux disease)     History of CVA (cerebrovascular accident) 6/1/2016    History of echocardiogram 5/2016    tr MR    History of TIA (transient ischemic attack) 2/17/2017    Hyperlipidemia     Hypertension     Meniere's disease     Migraine 2/17/2017    Murmur     functional, work up done    OAB (overactive bladder)     SUSU on CPAP 07/14/2016    Osteoarthritis, multiple sites     lumbar spine and right hip    Peptic ulcer disease 6/16/2017    PONV (postoperative nausea and vomiting)     Prolonged emergence from general anesthesia     Right lumbar radiculopathy 12/1/2016    Right rotator cuff tear 09/2018    RUQ abdominal pain 5/2/2017     Past Surgical History:   Procedure Laterality Date    BACK SURGERY  2006 ghislaine    BLADDER SUSPENSION  2015    BREAST CYST EXCISION      benign    CARDIOVASCULAR STRESS TEST  2008    CHOLECYSTECTOMY, LAPAROSCOPIC N/A 5/8/2017      LAPAROSCOPIC CHOLECYSTECTOMY  WITH CHOLANGIOGRAM  performed by Rajinder Collins MD at [address] [place] [address] COLONOSCOPY N/A 10/21/2020    DIAGNOSTIC COLONOSCOPY WITH POLYPECTOMY performed by Antonietta Christie MD at [place] [address]. Left 9/28/2020 REPAIR OF LEFT FEMORAL HERNIA WITH MESH performed by Cora Small MD at [address] [place] SCRN NOT  [address] IND N/A 2017    COLONOSCOPY performed by Raffi Rodas MD at [address] [facility] ESOPHAGOGASTRODUODENOSCOPY TRANSORAL DIAGNOSTIC N/A 2017    EGD ESOPHAGOGASTRODUODENOSCOPY performed by Raffi Rodas MD at [address]  2005    NEG    UPPER GASTROINTESTINAL ENDOSCOPY N/A 3/9/2020    EGD ESOPHAGOGASTRODUODENOSCOPY WITH POLYPECTOMY performed by Raffi Rodas MD at [facility] Marital status:      Spouse name: Not on file    Number of children: 3    Years of education: Not on file    Highest education level: Not on file   Occupational History    Occupation: Works at [id number] Smoking status: Former Smoker     Packs/day: 1.00     Years: 10.00     Pack years: 10.00     Quit date: 1987     Years since quittin.2    Smokeless tobacco: Never Used   Vaping Use    Vaping Use: Never used   Substance and Sexual Activity    Alcohol use: No    Drug use: No    Sexual activity: Not on file   Other Topics Concern    Not on file   Social History Narrative    Not on file     Social Determinants of Health     Financial Resource Strain: Low Risk     Difficulty of Paying Living Expenses: Not hard at all   Food Insecurity: No Food Insecurity    Worried About [facility] in the Last Year: Never true    [address] [facility] [address] in the Last Year: Never true   Transportation Needs: No Transportation Needs    Lack of Transportation (Medical): No    Lack of Transportation (Non-Medical):  No   Physical Activity:     Days of Exercise per Week:     Minutes of Exercise per Session:    Stress:     Feeling of Stress :    Social Connections:     Frequency of Communication with Friends and Family:     Frequency of Social Gatherings with Friends and Family:     Attends Jain Services:     Active Member of Clubs or Organizations:     Attends Club or Organization Meetings:     Marital Status:    Intimate Partner Violence:     Fear of Current or Ex-Partner:     Emotionally Abused:     Physically Abused:     Sexually Abused:      Family History   Problem Relation Age of Onset    Cancer Father         STOMACH    Heart Disease Mother     High Cholesterol Mother     Other Mother         gall bladder disease     Allergies   Allergen Reactions    Lipitor [Atorvastatin]      Patient reports severe leg cramping with Lipitor     Blue Dyes (Parenteral) Rash    Cephalosporins Rash     keflex     Current Outpatient Medications   Medication Sig Dispense Refill    promethazine-dextromethorphan (PROMETHAZINE-DM) 6.25-15 MG/5ML syrup Take 5 mLs by mouth 4 times daily as needed for Cough 180 mL 0    doxycycline hyclate (VIBRA-TABS) 100 MG tablet Take 1 tablet by mouth 2 times daily for 10 days 20 tablet 0    topiramate (TOPAMAX) 100 MG tablet TAKE 1 AND 1/2 TABLETS BY MOUTH TWO TIMES A DAY  90 tablet 0    sertraline (ZOLOFT) 100 MG tablet TAKE 2 TABLETS BY MOUTH EVERY DAY  60 tablet 0    pravastatin (PRAVACHOL) 20 MG tablet TAKE 1 TABLET BY MOUTH ONE TIME A DAY  90 tablet 0    potassium chloride (KLOR-CON) 10 MEQ extended release tablet TAKE 1 TABLET BY MOUTH ONE TIME A DAY  90 tablet 0    amLODIPine (NORVASC) 10 MG tablet TAKE 1 TABLET BY MOUTH EVERY  tablet 0    fluticasone (FLONASE) 50 MCG/ACT nasal spray 1 spray by Nasal route daily (Each nostril) 1 Bottle 1    benazepril-hydrochlorthiazide (LOTENSIN HCT) 20-12.5 MG per tablet TAKE 1 TABLET BY MOUTH TWO TIMES A DAY      spironolactone (ALDACTONE) 25 MG tablet TAKE 1 TABLET BY MOUTH EVERY DAY  90 tablet 0    SUMAtriptan (IMITREX) 50 MG tablet TAKE 1 TABLET BY MOUTH ONCE AS NEEDED FOR MIGRAINE (Patient taking differently: 100 mg 2 times daily TAKE 1 TABLET BY MOUTH ONCE AS NEEDED FOR MIGRAINE) 7 tablet 5  labetalol (NORMODYNE) 200 MG tablet TAKE 1 AND 1/2 TABLETS BY MOUTH TWO TIMES A DAY  (Patient taking differently: Take 2 tabs two times daily) 270 tablet 1    NONFORMULARY Take 20 mg by mouth 2 times daily [employer] heartburn relief      acetaminophen (TYLENOL) 325 MG tablet Take 650 mg by mouth every 6 hours as needed for Pain      Folic Acid (FOLATE PO) Take by mouth      Cyanocobalamin (B-12 PO) Take 1,000 Units by mouth      Magnesium 500 MG TABS Take by mouth      b complex vitamins capsule Take 1 capsule by mouth daily      clopidogrel (PLAVIX) 75 MG tablet Take 75 mg by mouth daily EVERY OTHER DAY      Multiple Vitamin (MULTIVITAMIN PO) Take  by mouth. No current facility-administered medications for this visit. PMH, Surgical Hx, Family Hx, and Social Hx reviewed and updated. Health Maintenance reviewed. Objective    Vitals:    08/10/21 1700 08/10/21 1707   BP: (!) 140/80 (!) 140/70   Site: Right Upper Arm Right Upper Arm   Position: Sitting Sitting   Cuff Size: Medium Adult Medium Adult   Pulse: 76    Temp: 98.1 °F (36.7 °C)    TempSrc: Tympanic    SpO2: 99%    Weight: 167 lb (75.8 kg)        Physical Exam  Constitutional:       General: She is not in acute distress. Appearance: Normal appearance. She is normal weight. She is not ill-appearing, toxic-appearing or diaphoretic. HENT:      Head: Normocephalic and atraumatic. Right Ear: Hearing, ear canal and external ear normal. Tympanic membrane is bulging. Left Ear: Hearing, ear canal and external ear normal. Tympanic membrane is bulging. Nose: Mucosal edema and congestion present. No rhinorrhea. Cardiovascular:      Rate and Rhythm: Normal rate and regular rhythm. Pulses: Normal pulses. Pulmonary:      Effort: Pulmonary effort is normal.      Breath sounds: Normal breath sounds. Abdominal:      General: Bowel sounds are normal. There is no distension. Palpations: Abdomen is soft.       Tenderness: There is no abdominal tenderness. Musculoskeletal:         General: No tenderness or signs of injury. Normal range of motion. Cervical back: Normal range of motion and neck supple. No rigidity or tenderness. Skin:     General: Skin is warm and dry. Capillary Refill: Capillary refill takes less than 2 seconds. Neurological:      General: No focal deficit present. Mental Status: She is alert and oriented to person, place, and time. Mental status is at baseline. Cranial Nerves: No cranial nerve deficit. Sensory: No sensory deficit. Motor: No weakness. Coordination: Coordination normal.         Assessment & Plan    Diagnosis Orders   1. URI with cough and congestion  COVID-19    promethazine-dextromethorphan (PROMETHAZINE-DM) 6.25-15 MG/5ML syrup    doxycycline hyclate (VIBRA-TABS) 100 MG tablet   2. Sinus congestion  COVID-19    promethazine-dextromethorphan (PROMETHAZINE-DM) 6.25-15 MG/5ML syrup    doxycycline hyclate (VIBRA-TABS) 100 MG tablet     Orders Placed This Encounter   Procedures    COVID-19     Standing Status:   Future     Standing Expiration Date:   8/10/2022     Scheduling Instructions:      1) Due to current limited availability of the COVID-19 test, tests will be prioritized based on responses to questions above. Testing may be delayed due to volume. 2) Print and instruct patient to adhere to CDC home isolation program. (Link Above)              3) Set up or refer patient for a monitoring program.              4) Have patient sign up for and leverage MyChart (if not previously done). Order Specific Question:   Is this test for diagnosis or screening? Answer:   Diagnosis of ill patient     Order Specific Question:   Symptomatic for COVID-19 as defined by CDC? Answer:   Yes     Order Specific Question:   Date of Symptom Onset     Answer:   8/8/2021     Order Specific Question:   Hospitalized for COVID-19?      Answer:   No     Order Specific Question:   Admitted to ICU for COVID-19? Answer:   No     Order Specific Question:   Employed in healthcare setting? Answer:   No     Order Specific Question:   Resident in a congregate (group) care setting? Answer:   No     Order Specific Question:   Pregnant? Answer:   No     Order Specific Question:   Previously tested for COVID-19? Answer:   Yes     Orders Placed This Encounter   Medications    promethazine-dextromethorphan (PROMETHAZINE-DM) 6.25-15 MG/5ML syrup     Sig: Take 5 mLs by mouth 4 times daily as needed for Cough     Dispense:  180 mL     Refill:  0    doxycycline hyclate (VIBRA-TABS) 100 MG tablet     Sig: Take 1 tablet by mouth 2 times daily for 10 days     Dispense:  20 tablet     Refill:  0     There are no discontinued medications. Return in about 1 week (around 8/17/2021), or if symptoms worsen or fail to improve, for follow up with PCP. Reviewed with the patient: current clinical status,medications, activities and diet. Side effects, adverse effects of themedication prescribed today, as well as treatment plan/ rationale and result expectations have been discussed with the patient who expresses understanding and desires to proceed. Close follow up to evaluate treatment results and for coordination of care. I have reviewed the patient's medical history in detail and updated the computerized patient record.      Antolin Obrien, PRATIK - CNP

## 2021-09-04 ENCOUNTER — HEALTH MAINTENANCE LETTER (OUTPATIENT)
Age: 26
End: 2021-09-04

## 2022-05-26 ENCOUNTER — OFFICE VISIT (OUTPATIENT)
Dept: OPHTHALMOLOGY | Facility: CLINIC | Age: 27
End: 2022-05-26
Payer: COMMERCIAL

## 2022-05-26 DIAGNOSIS — H52.13 MYOPIA OF BOTH EYES: Primary | ICD-10-CM

## 2022-05-26 DIAGNOSIS — H35.411 LATTICE DEGENERATION, RIGHT EYE: ICD-10-CM

## 2022-05-26 PROCEDURE — 92015 DETERMINE REFRACTIVE STATE: CPT | Performed by: OPTOMETRIST

## 2022-05-26 PROCEDURE — 92014 COMPRE OPH EXAM EST PT 1/>: CPT | Performed by: OPTOMETRIST

## 2022-05-26 ASSESSMENT — REFRACTION_WEARINGRX
OS_CYLINDER: SPHERE
OD_CYLINDER: SPHERE
SPECS_TYPE: SVL
OS_SPHERE: -0.75
OD_SPHERE: -2.75

## 2022-05-26 ASSESSMENT — CONF VISUAL FIELD
METHOD: COUNTING FINGERS
OD_NORMAL: 1
OS_NORMAL: 1

## 2022-05-26 ASSESSMENT — SLIT LAMP EXAM - LIDS
COMMENTS: NORMAL
COMMENTS: NORMAL

## 2022-05-26 ASSESSMENT — VISUAL ACUITY
METHOD: SNELLEN - LINEAR
CORRECTION_TYPE: GLASSES
OD_CC: 20/25
OS_CC: 20/25
OD_CC+: -1
OS_CC+: -1

## 2022-05-26 ASSESSMENT — EXTERNAL EXAM - LEFT EYE: OS_EXAM: NORMAL

## 2022-05-26 ASSESSMENT — CUP TO DISC RATIO
OD_RATIO: 0.45
OS_RATIO: 0.45

## 2022-05-26 ASSESSMENT — REFRACTION_MANIFEST
OD_SPHERE: -3.00
OS_SPHERE: -1.00
OS_CYLINDER: SPHERE
OD_CYLINDER: SPHERE

## 2022-05-26 ASSESSMENT — EXTERNAL EXAM - RIGHT EYE: OD_EXAM: NORMAL

## 2022-05-26 ASSESSMENT — TONOMETRY
IOP_METHOD: ICARE
OD_IOP_MMHG: 17
OS_IOP_MMHG: 17

## 2022-05-26 NOTE — PROGRESS NOTES
History  HPI     Annual Eye Exam     In both eyes.  Associated symptoms include Negative for double vision, eye pain, flashes and floaters.  Treatments tried include no treatments.  Pain was noted as 0/10.              Comments     Patient present for annual eye exam. Patient has no complaints at this time.    ALEX Hernandez May 26, 2022 2:20 PM           Last edited by Adenike Mary on 5/26/2022  2:20 PM. (History)          Assessment/Plan  (H52.13) Myopia of both eyes  (primary encounter diagnosis)  Comment: Myopia both eyes, small increase in refractive error   Plan: REFRACTION         Educated patient on condition and clinical findings. Dispensed spectacle prescription for full time wear. Monitor annually.    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 If symptoms present, return to clinic immediately.    Return to clinic in 1 year for comprehensive eye exam.    Complete documentation of historical and exam elements from today's encounter can  be found in the full encounter summary report (not reduplicated in this progress  note). I personally obtained the chief complaint(s) and history of present illness. I  confirmed and edited as necessary the review of systems, past medical/surgical  history, family history, social history, and examination findings as documented by  others; and I examined the patient myself. I personally reviewed the relevant tests,  images, and reports as documented above. I formulated and edited as necessary the  assessment and plan and discussed the findings and management plan with the  patient and family.    Daniel Beckford, CAROLINE, FAAO

## 2022-05-26 NOTE — NURSING NOTE
Chief Complaints and History of Present Illnesses   Patient presents with     Annual Eye Exam     Chief Complaint(s) and History of Present Illness(es)     Annual Eye Exam     Laterality: both eyes    Associated symptoms: Negative for double vision, eye pain, flashes and floaters    Treatments tried: no treatments    Pain scale: 0/10              Comments     Patient present for annual eye exam. Patient has no complaints at this time.    ALEX Hernandez May 26, 2022 2:20 PM

## 2022-06-11 ENCOUNTER — HEALTH MAINTENANCE LETTER (OUTPATIENT)
Age: 27
End: 2022-06-11

## 2022-09-22 ENCOUNTER — OFFICE VISIT (OUTPATIENT)
Dept: INTERNAL MEDICINE | Facility: CLINIC | Age: 27
End: 2022-09-22
Payer: COMMERCIAL

## 2022-09-22 VITALS
OXYGEN SATURATION: 100 % | BODY MASS INDEX: 23.44 KG/M2 | DIASTOLIC BLOOD PRESSURE: 71 MMHG | HEIGHT: 65 IN | WEIGHT: 140.7 LBS | SYSTOLIC BLOOD PRESSURE: 118 MMHG | HEART RATE: 67 BPM

## 2022-09-22 DIAGNOSIS — J30.81 ALLERGY TO DOG DANDER: ICD-10-CM

## 2022-09-22 DIAGNOSIS — L23.81 ALLERGIC CONTACT DERMATITIS DUE TO ANIMAL (CAT) (DOG) DANDER: ICD-10-CM

## 2022-09-22 DIAGNOSIS — L20.82 FLEXURAL ECZEMA: Primary | ICD-10-CM

## 2022-09-22 DIAGNOSIS — J30.81 ANIMAL DANDER ALLERGY: ICD-10-CM

## 2022-09-22 PROCEDURE — 99213 OFFICE O/P EST LOW 20 MIN: CPT | Performed by: NURSE PRACTITIONER

## 2022-09-22 RX ORDER — CLOBETASOL PROPIONATE 0.5 MG/G
CREAM TOPICAL 2 TIMES DAILY
Qty: 60 G | Refills: 4 | Status: SHIPPED | OUTPATIENT
Start: 2022-09-22

## 2022-09-22 RX ORDER — CETIRIZINE HYDROCHLORIDE 10 MG/1
10 TABLET ORAL DAILY
Qty: 90 TABLET | Refills: 4 | Status: SHIPPED | OUTPATIENT
Start: 2022-09-22 | End: 2023-09-28

## 2022-09-22 NOTE — NURSING NOTE
Analilia Mora is a 27 year old female patient that presents today in clinic for the following:    Chief Complaint   Patient presents with     Consult For     Rash on elbows bilaterally, growth on lower back     The patient's allergies and medications were reviewed as noted. A set of vitals were recorded as noted without incident. The patient does not have any other questions for the provider.    Sly Reyna, EMT at 6:27 PM on 9/22/2022

## 2022-09-26 ENCOUNTER — HOSPITAL ENCOUNTER (EMERGENCY)
Facility: CLINIC | Age: 27
Discharge: HOME | End: 2022-09-26
Attending: EMERGENCY MEDICINE | Admitting: EMERGENCY MEDICINE
Payer: COMMERCIAL

## 2022-09-26 VITALS
OXYGEN SATURATION: 99 % | WEIGHT: 140 LBS | DIASTOLIC BLOOD PRESSURE: 76 MMHG | BODY MASS INDEX: 23.9 KG/M2 | TEMPERATURE: 98.6 F | HEIGHT: 64 IN | RESPIRATION RATE: 16 BRPM | SYSTOLIC BLOOD PRESSURE: 125 MMHG | HEART RATE: 90 BPM

## 2022-09-26 DIAGNOSIS — J02.9 PHARYNGITIS, UNSPECIFIED ETIOLOGY: ICD-10-CM

## 2022-09-26 PROBLEM — J30.81 ALLERGY TO DOG DANDER: Status: ACTIVE | Noted: 2022-09-26

## 2022-09-26 PROBLEM — L23.81 ALLERGIC CONTACT DERMATITIS DUE TO ANIMAL (CAT) (DOG) DANDER: Status: ACTIVE | Noted: 2022-09-26

## 2022-09-26 LAB — GROUP A STREP BY PCR: NOT DETECTED

## 2022-09-26 PROCEDURE — 99282 EMERGENCY DEPT VISIT SF MDM: CPT | Performed by: EMERGENCY MEDICINE

## 2022-09-26 PROCEDURE — 87651 STREP A DNA AMP PROBE: CPT | Performed by: EMERGENCY MEDICINE

## 2022-09-26 PROCEDURE — 99283 EMERGENCY DEPT VISIT LOW MDM: CPT | Performed by: EMERGENCY MEDICINE

## 2022-09-26 ASSESSMENT — ENCOUNTER SYMPTOMS
CONFUSION: 0
SHORTNESS OF BREATH: 0
DIFFICULTY URINATING: 0
EYE REDNESS: 0
HEADACHES: 0
ABDOMINAL PAIN: 0
COUGH: 0
SEIZURES: 0
FEVER: 0
DIARRHEA: 0
VOMITING: 0
NAUSEA: 0
SORE THROAT: 1
BACK PAIN: 0

## 2022-09-26 ASSESSMENT — ACTIVITIES OF DAILY LIVING (ADL): ADLS_ACUITY_SCORE: 35

## 2022-09-26 NOTE — ED PROVIDER NOTES
ED Provider Note  [facility]      History     Chief Complaint   Patient presents with     Pharyngitis     HPI  Analilia Mora is a 27 year old female who presents emergency department with left-sided throat pain for the last 1 day.  She reports she has been doing salt water gargles without significant improvement.  Has no history of prior episodes of this, no sick contacts that she is aware of.  No fevers or chills.  She is able to eat and drink.  Has had no change in bladder or bowel habits.  No other medical problems that she is reporting.    Past Medical History  No past medical history on file.  Past Surgical History:   Procedure Laterality Date     NO HISTORY OF SURGERY       cetirizine (ZYRTEC) 10 MG tablet  clobetasol (TEMOVATE) 0.05 % external cream      No Known Allergies  Family History  Family History   Problem Relation Age of Onset     Allergies Other         aunt     Alcohol/Drug Other         aunt, uncle     Neurologic Disorder Brother      Glaucoma No family hx of      Macular Degeneration No family hx of      Social History   Social History     Tobacco Use     Smoking status: Never Smoker     Smokeless tobacco: Never Used     Tobacco comment: not exposed to 2nd hand smoke   Substance Use Topics     Alcohol use: No     Drug use: No      Past medical history, past surgical history, medications, allergies, family history, and social history were reviewed with the patient. No additional pertinent items.       Review of Systems   Constitutional: Negative for fever.   HENT: Positive for sore throat. Negative for congestion.    Eyes: Negative for redness.   Respiratory: Negative for cough and shortness of breath.    Cardiovascular: Negative for chest pain.   Gastrointestinal: Negative for abdominal pain, diarrhea, nausea and vomiting.   Genitourinary: Negative for difficulty urinating.   Musculoskeletal: Negative for back pain.   Skin: Negative for rash.   Neurological:  "Negative for seizures and headaches.   Psychiatric/Behavioral: Negative for confusion.     A complete review of systems was performed with pertinent positives and negatives noted in the HPI, and all other systems negative.    Physical Exam   BP: 125/76  Pulse: 90  Temp: 98.6  F (37  C)  Resp: 16  Height: 162.6 cm (5' 4\")  Weight: 63.5 kg (140 lb)  SpO2: 99 %  Physical Exam  Constitutional:       General: She is awake. She is not in acute distress.     Appearance: Normal appearance. She is well-developed. She is not ill-appearing or toxic-appearing.   HENT:      Head: Atraumatic.      Mouth/Throat:      Pharynx: Pharyngeal swelling and posterior oropharyngeal erythema present. No oropharyngeal exudate.   Eyes:      General: No scleral icterus.     Pupils: Pupils are equal, round, and reactive to light.   Cardiovascular:      Rate and Rhythm: Normal rate and regular rhythm.      Heart sounds: Normal heart sounds, S1 normal and S2 normal.   Pulmonary:      Effort: No respiratory distress.      Breath sounds: Normal breath sounds.   Abdominal:      General: Bowel sounds are normal.      Palpations: Abdomen is soft.      Tenderness: There is no abdominal tenderness.   Musculoskeletal:         General: No tenderness.   Skin:     General: Skin is warm.      Findings: No rash.   Neurological:      Mental Status: She is alert and oriented to person, place, and time.   Psychiatric:         Attention and Perception: Attention normal.         Mood and Affect: Mood normal.         Speech: Speech normal.         Behavior: Behavior normal. Behavior is cooperative.         ED Course      Procedures                     Results for orders placed or performed during the hospital encounter of 09/26/22   Group A Streptococcus PCR Throat Swab     Status: Normal    Specimen: Throat; Swab   Result Value Ref Range    Group A strep by PCR Not Detected Not Detected    Narrative    The Xpert Xpress Strep A test, performed on the [employer]  " PicLyf, is a rapid, qualitative in vitro diagnostic test for the detection of Streptococcus pyogenes (Group A ß-hemolytic Streptococcus, Strep A) in throat swab specimens from patients with signs and symptoms of pharyngitis. The Xpert Xpress Strep A test can be used as an aid in the diagnosis of Group A Streptococcal pharyngitis. The assay is not intended to monitor treatment for Group A Streptococcus infections. The Xpert Xpress Strep A test utilizes an automated real-time polymerase chain reaction (PCR) to detect Streptococcus pyogenes DNA.     Medications - No data to display     Assessments & Plan (with Medical Decision Making)   Analilia Mora is a 27 year old female who presents emergency department with left-sided throat pain for the last 1 day.  Concerning for viral versus bacterial pharyngitis, low likelihood to be strep, however we will send off for strep test.  If negative, will discharge with recommendation for NSAID use and conservative management.    I have reviewed the nursing notes. I have reviewed the findings, diagnosis, plan and need for follow up with the patient.    Strep test is indeed negative.  Hence, this is most consistent with a viral etiology.  We will hold off on antibiotics at this time.  Patient has not tried any NSAIDs, advised her to take up to 600 mg of ibuprofen as well as Tylenol as needed and as directed.  Return precautions given.  She voiced understanding and agreement with the plan.  Okay to discharge.    New Prescriptions    No medications on file       Final diagnoses:   Pharyngitis, unspecified etiology       --  Ramon Hunter MD PhD  [facility] EMERGENCY DEPARTMENT  9/26/2022     Ramon Hunter MD  09/26/22 [id number]

## 2022-09-26 NOTE — ED TRIAGE NOTES
"Triage Assessment & Note:    /76   Pulse 90   Temp 98.6  F (37  C) (Temporal)   Resp 16   Ht 1.626 m (5' 4\")   Wt 63.5 kg (140 lb)   LMP 08/28/2022 (Exact Date)   SpO2 99%   BMI 24.03 kg/m      Patient presents with: PT c/o throat pain with painful swallowing. PT denies any difficulty breathing.     Home Treatments/Remedies: None    Febrile / Afebrile? Afebrile     Duration of C/o: 1 day     Nick Myers RN  September 26, 2022         Triage Assessment     Row Name 09/26/22 1468       Triage Assessment (Adult)    Airway WDL WDL       Respiratory WDL    Respiratory WDL WDL       Cardiac WDL    Cardiac WDL WDL              "

## 2022-09-27 NOTE — PROGRESS NOTES
"S:Analilia Mora is a 27 year old female accompanied by her sister for evaluation of an arm rash. She has a recurring rash on both her external distal upper arms. It is slightly pruritic and has resolved in the past with steroid creams but recurs.      She also has allergies to cats and dogs and lives with one of each. She sneezes and has rhinorrhea.She does not take any allergy medications.     Patient Active Problem List   Diagnosis     Atopic dermatitis     Recurrent herpes simplex on low back     Allergy to dog dander     Allergic contact dermatitis due to animal (cat) (dog) dander          No past medical history on file.         Past Surgical History:   Procedure Laterality Date     NO HISTORY OF SURGERY              Social History     Tobacco Use     Smoking status: Never Smoker     Smokeless tobacco: Never Used     Tobacco comment: not exposed to 2nd hand smoke   Substance Use Topics     Alcohol use: No            Family History   Problem Relation Age of Onset     Allergies Other         aunt     Alcohol/Drug Other         aunt, uncle     Neurologic Disorder Brother      Glaucoma No family hx of      Macular Degeneration No family hx of              No Known Allergies         Current Outpatient Medications   Medication Sig Dispense Refill     cetirizine (ZYRTEC) 10 MG tablet Take 1 tablet (10 mg) by mouth daily 90 tablet 4     clobetasol (TEMOVATE) 0.05 % external cream Apply topically 2 times daily 60 g 4       REVIEW OF SYSTEMS:  See above.    O:   /71 (BP Location: Right arm, Patient Position: Sitting, Cuff Size: Adult Regular)   Pulse 67   Ht 1.642 m (5' 4.65\")   Wt 63.8 kg (140 lb 11.2 oz)   LMP 08/28/2022 (Exact Date)   SpO2 100%   Breastfeeding No   BMI 23.67 kg/m    GENERAL APPEARANCE: healthy, alert and no distress  NEURO: Grossly normal  MUSK: ambulatory  SKIN:  She has a 2 cm x 3 cm dermatitis over the flexor surface of both arms.  PSYCHE: normal    A/P:  Analilia was seen today " for consult for.    Diagnoses and all orders for this visit:    Flexural eczema  -     clobetasol (TEMOVATE) 0.05 % external cream; Apply topically 2 times daily  -     cetirizine (ZYRTEC) 10 MG tablet; Take 1 tablet (10 mg) by mouth daily    Animal dander allergy    Allergy to dog dander    Allergic contact dermatitis due to animal (cat) (dog) dander      The patient voiced understanding of the information discussed and all questions were answered.     I spent a total of 25 minutes in the care of this pt during today's office visit. This time includes reviewing the patient's chart and prior history, obtaining a history, performing an examination and evaluation and counseling the patient. This time also includes ordering medications or tests necessary in addition to communication to other member's of the patient's health care team. Time spent in documentation and care coordination is included.     Shante MCKEON, CNP

## 2022-10-22 ENCOUNTER — HEALTH MAINTENANCE LETTER (OUTPATIENT)
Age: 27
End: 2022-10-22

## 2023-05-16 ENCOUNTER — NURSE TRIAGE (OUTPATIENT)
Dept: NURSING | Facility: CLINIC | Age: 28
End: 2023-05-16
Payer: COMMERCIAL
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
## 2023-05-18 ENCOUNTER — ALLIED HEALTH/NURSE VISIT (OUTPATIENT)
Dept: FAMILY MEDICINE | Facility: CLINIC | Age: 28
End: 2023-05-18
Payer: COMMERCIAL

## 2023-05-18 DIAGNOSIS — Z23 ENCOUNTER FOR IMMUNIZATION: Primary | ICD-10-CM

## 2023-05-18 PROCEDURE — 90471 IMMUNIZATION ADMIN: CPT

## 2023-05-18 PROCEDURE — 90715 TDAP VACCINE 7 YRS/> IM: CPT

## 2023-05-18 PROCEDURE — 99207 PR NO CHARGE NURSE ONLY: CPT

## 2023-05-18 NOTE — PROGRESS NOTES
Prior to immunization administration, verified patients identity using patient s name and date of birth. Please see Immunization Activity for additional information.     Screening Questionnaire for Adult Immunization    Are you sick today?   No   Do you have allergies to medications, food, a vaccine component or latex?   No   Have you ever had a serious reaction after receiving a vaccination?   No   Do you have a long-term health problem with heart, lung, kidney, or metabolic disease (e.g., diabetes), asthma, a blood disorder, no spleen, complement component deficiency, a cochlear implant, or a spinal fluid leak?  Are you on long-term aspirin therapy?   No   Do you have cancer, leukemia, HIV/AIDS, or any other immune system problem?   No   Do you have a parent, brother, or sister with an immune system problem?   No   In the past 3 months, have you taken medications that affect  your immune system, such as prednisone, other steroids, or anticancer drugs; drugs for the treatment of rheumatoid arthritis, Crohn s disease, or psoriasis; or have you had radiation treatments?   No   Have you had a seizure, or a brain or other nervous system problem?   No   During the past year, have you received a transfusion of blood or blood    products, or been given immune (gamma) globulin or antiviral drug?   No   For women: Are you pregnant or is there a chance you could become       pregnant during the next month?   No   Have you received any vaccinations in the past 4 weeks?   No     Immunization questionnaire answers were all negative.    I have reviewed the following standing orders:   This patient is due and qualifies for a TDAP vaccine.    Click here for Tdap Standing Order    I have reviewed the vaccines inclusion and exclusion criteria; No concerns regarding eligibility.         Injection of Adacel given by Akosua Blanco CMA. Patient instructed to remain in clinic for 15 minutes afterwards, and to report any adverse  reactions.     Screening performed by Akosua Blanco CMA on 5/18/2023 at 2:55 PM.

## 2023-05-31 ENCOUNTER — OFFICE VISIT (OUTPATIENT)
Dept: OPHTHALMOLOGY | Facility: CLINIC | Age: 28
End: 2023-05-31
Payer: COMMERCIAL

## 2023-05-31 DIAGNOSIS — H52.13 MYOPIA OF BOTH EYES: Primary | ICD-10-CM

## 2023-05-31 DIAGNOSIS — H35.411 LATTICE DEGENERATION, RIGHT EYE: ICD-10-CM

## 2023-05-31 PROCEDURE — 92015 DETERMINE REFRACTIVE STATE: CPT | Performed by: OPTOMETRIST

## 2023-05-31 PROCEDURE — 92014 COMPRE OPH EXAM EST PT 1/>: CPT | Performed by: OPTOMETRIST

## 2023-05-31 ASSESSMENT — REFRACTION_WEARINGRX
OD_CYLINDER: SPHERE
OS_SPHERE: -1.00
OD_SPHERE: -3.00
OS_CYLINDER: SPHERE

## 2023-05-31 ASSESSMENT — VISUAL ACUITY
OD_CC: 20/20
CORRECTION_TYPE: GLASSES
OS_CC: 20/20
OS_CC+: -1
OD_CC+: -1
METHOD: SNELLEN - LINEAR

## 2023-05-31 ASSESSMENT — EXTERNAL EXAM - LEFT EYE: OS_EXAM: NORMAL

## 2023-05-31 ASSESSMENT — SLIT LAMP EXAM - LIDS
COMMENTS: NORMAL
COMMENTS: NORMAL

## 2023-05-31 ASSESSMENT — CONF VISUAL FIELD
OD_INFERIOR_NASAL_RESTRICTION: 0
OD_SUPERIOR_TEMPORAL_RESTRICTION: 0
OS_SUPERIOR_TEMPORAL_RESTRICTION: 0
OD_NORMAL: 1
OS_SUPERIOR_NASAL_RESTRICTION: 0
OS_NORMAL: 1
METHOD: COUNTING FINGERS
OD_INFERIOR_TEMPORAL_RESTRICTION: 0
OS_INFERIOR_TEMPORAL_RESTRICTION: 0
OD_SUPERIOR_NASAL_RESTRICTION: 0
OS_INFERIOR_NASAL_RESTRICTION: 0

## 2023-05-31 ASSESSMENT — REFRACTION_MANIFEST
OD_CYLINDER: SPHERE
OD_SPHERE: -3.25
OS_AXIS: 155
OS_SPHERE: -1.00
OS_CYLINDER: +0.25

## 2023-05-31 ASSESSMENT — TONOMETRY
OS_IOP_MMHG: 17
IOP_METHOD: ICARE
OD_IOP_MMHG: 15

## 2023-05-31 ASSESSMENT — CUP TO DISC RATIO
OD_RATIO: 0.5
OS_RATIO: 0.5

## 2023-05-31 ASSESSMENT — EXTERNAL EXAM - RIGHT EYE: OD_EXAM: NORMAL

## 2023-05-31 NOTE — NURSING NOTE
Chief Complaints and History of Present Illnesses   Patient presents with     Annual Eye Exam     Pt here for annual eye exam.      Chief Complaint(s) and History of Present Illness(es)     Annual Eye Exam            Laterality: both eyes    Associated symptoms: Negative for eye pain and headache    Comments: Pt here for annual eye exam.           Comments    Pt has unchanged vision since last exam. No new concerns.   ALEX Sykes on 5/31/2023 at 2:21 PM

## 2023-05-31 NOTE — PROGRESS NOTES
History  HPI     Annual Eye Exam    In both eyes.  Associated symptoms include Negative for eye pain and headache. Additional comments: Pt here for annual eye exam.            Comments    Pt has unchanged vision since last exam. No new concerns.   ALEX Sykes on 5/31/2023 at 2:21 PM            Last edited by Alma Kumar COA on 5/31/2023  2:21 PM.          Assessment/Plan  (H52.13) Myopia of both eyes  (primary encounter diagnosis)  Comment: Myopia both eyes   Plan: REFRACTION         Educated patient on condition and clinical findings. Dispensed spectacle prescription for full time wear. Monitor annually.    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 If symptoms present, return to clinic immediately.    Return to clinic in 1 year for comprehensive eye exam.    Complete documentation of historical and exam elements from today's encounter can  be found in the full encounter summary report (not reduplicated in this progress  note). I personally obtained the chief complaint(s) and history of present illness. I  confirmed and edited as necessary the review of systems, past medical/surgical  history, family history, social history, and examination findings as documented by  others; and I examined the patient myself. I personally reviewed the relevant tests,  images, and reports as documented above. I formulated and edited as necessary the  assessment and plan and discussed the findings and management plan with the  patient and family.    Daniel Beckford OD, FAAO

## 2023-06-17 ENCOUNTER — HEALTH MAINTENANCE LETTER (OUTPATIENT)
Age: 28
End: 2023-06-17

## 2023-09-28 DIAGNOSIS — L20.82 FLEXURAL ECZEMA: ICD-10-CM

## 2023-09-29 RX ORDER — CETIRIZINE HYDROCHLORIDE 10 MG/1
10 TABLET ORAL DAILY
Qty: 90 TABLET | Refills: 0 | Status: SHIPPED | OUTPATIENT
Start: 2023-09-29 | End: 2024-03-12

## 2023-09-29 NOTE — TELEPHONE ENCOUNTER
cetirizine (ZYRTEC) 10 MG tablet         Sig: Take 1 tablet (10 mg) by mouth daily         Last Written Prescription Date:  9/22/2022  Last Fill Quantity: 90,   # refills: 4  Last Office Visit : 9/22/22  Future Office visit:  none    Routing refill request to provider for review/approval because:  Pt is overdue for annual clinic visit. 90 days given w/ NO REFILLS. Please call to schedule.

## 2024-03-11 DIAGNOSIS — L20.82 FLEXURAL ECZEMA: ICD-10-CM

## 2024-03-18 RX ORDER — CETIRIZINE HYDROCHLORIDE 10 MG/1
10 TABLET ORAL DAILY
Qty: 30 TABLET | Refills: 0 | Status: SHIPPED | OUTPATIENT
Start: 2024-03-18

## 2024-03-19 NOTE — TELEPHONE ENCOUNTER
30 day lesley refill sent to the pharmacy - including instructions for patient to call the clinic and schedule an appointment.  9/22/2022  [facility] Internal Medicine Shante Subramanian APRN CNP  Internal Medicine

## 2024-05-15 ENCOUNTER — OFFICE VISIT (OUTPATIENT)
Dept: OPHTHALMOLOGY | Facility: CLINIC | Age: 29
End: 2024-05-15
Payer: COMMERCIAL

## 2024-05-15 DIAGNOSIS — H35.411 LATTICE DEGENERATION, RIGHT EYE: ICD-10-CM

## 2024-05-15 DIAGNOSIS — H52.13 MYOPIA OF BOTH EYES: Primary | ICD-10-CM

## 2024-05-15 DIAGNOSIS — H52.31 ANISOMETROPIA: ICD-10-CM

## 2024-05-15 PROCEDURE — 92015 DETERMINE REFRACTIVE STATE: CPT | Performed by: OPTOMETRIST

## 2024-05-15 PROCEDURE — 92014 COMPRE OPH EXAM EST PT 1/>: CPT | Performed by: OPTOMETRIST

## 2024-05-15 ASSESSMENT — CUP TO DISC RATIO
OS_RATIO: 0.4
OD_RATIO: 0.5

## 2024-05-15 ASSESSMENT — SLIT LAMP EXAM - LIDS
COMMENTS: NORMAL
COMMENTS: NORMAL

## 2024-05-15 ASSESSMENT — CONF VISUAL FIELD
OS_INFERIOR_NASAL_RESTRICTION: 0
OD_INFERIOR_TEMPORAL_RESTRICTION: 0
OS_INFERIOR_TEMPORAL_RESTRICTION: 0
OS_NORMAL: 1
OS_SUPERIOR_NASAL_RESTRICTION: 0
OD_NORMAL: 1
OD_SUPERIOR_TEMPORAL_RESTRICTION: 0
OS_SUPERIOR_TEMPORAL_RESTRICTION: 0
METHOD: COUNTING FINGERS
OD_INFERIOR_NASAL_RESTRICTION: 0
OD_SUPERIOR_NASAL_RESTRICTION: 0

## 2024-05-15 ASSESSMENT — VISUAL ACUITY
OS_CC+: -2
METHOD: SNELLEN - LINEAR
OS_CC: 20/25
OD_CC: 20/20
CORRECTION_TYPE: GLASSES

## 2024-05-15 ASSESSMENT — TONOMETRY
OD_IOP_MMHG: 11
IOP_METHOD: ICARE
OS_IOP_MMHG: 11

## 2024-05-15 ASSESSMENT — REFRACTION_WEARINGRX
OS_SPHERE: -1.00
OS_CYLINDER: SPHERE
SPECS_TYPE: SVL
OD_SPHERE: -3.00
OD_CYLINDER: SPHERE

## 2024-05-15 ASSESSMENT — REFRACTION_MANIFEST
OS_AXIS: 115
OD_CYLINDER: SPHERE
OS_CYLINDER: +0.50
OD_SPHERE: -3.00
OS_SPHERE: -0.50

## 2024-05-15 ASSESSMENT — EXTERNAL EXAM - LEFT EYE: OS_EXAM: NORMAL

## 2024-05-15 ASSESSMENT — EXTERNAL EXAM - RIGHT EYE: OD_EXAM: NORMAL

## 2024-05-15 NOTE — PROGRESS NOTES
History  HPI       COMPREHENSIVE EYE EXAM    In both eyes.  Associated symptoms include Negative for dryness, eye pain, tearing, flashes and floaters.  Treatments tried include no treatments.  Pain was noted as 0/10.             Comments    Pt has no concerns today, here for annual check, notes gls RX is working well for her.     Kimberly Almazan COT May 15, 2024 12:49 PM              Last edited by Adenike Almazan on 5/15/2024 12:49 PM.          Assessment/Plan  (H52.13) Myopia of both eyes  (primary encounter diagnosis)  (H52.31) Anisometropia  Comment: Myopia right eye > left eye, not interested in contact lenses   Plan: REFRACTION         Educated patient on condition and clinical findings. Dispensed spectacle prescription for full time wear. Monitor annually.    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 If symptoms present, return to clinic immediately.    Return to clinic in 1 year for comprehensive eye exam.    Complete documentation of historical and exam elements from today's encounter can  be found in the full encounter summary report (not reduplicated in this progress  note). I personally obtained the chief complaint(s) and history of present illness. I  confirmed and edited as necessary the review of systems, past medical/surgical  history, family history, social history, and examination findings as documented by  others; and I examined the patient myself. I personally reviewed the relevant tests,  images, and reports as documented above. I formulated and edited as necessary the  assessment and plan and discussed the findings and management plan with the  patient and family.    Daniel Beckford, CAROLINE, FAAO

## 2024-05-15 NOTE — NURSING NOTE
Chief Complaints and History of Present Illnesses   Patient presents with    COMPREHENSIVE EYE EXAM     Chief Complaint(s) and History of Present Illness(es)       COMPREHENSIVE EYE EXAM              Laterality: both eyes    Associated symptoms: Negative for dryness, eye pain, tearing, flashes and floaters    Treatments tried: no treatments    Pain scale: 0/10              Comments    Pt has no concerns today, here for annual check, notes gls RX is working well for her.     Kimberly Almazan COT May 15, 2024 12:49 PM

## 2024-06-01 DIAGNOSIS — L20.82 FLEXURAL ECZEMA: ICD-10-CM

## 2024-06-07 RX ORDER — CETIRIZINE HYDROCHLORIDE 10 MG/1
10 TABLET ORAL DAILY
Qty: 30 TABLET | Refills: 0 | OUTPATIENT
Start: 2024-06-07

## 2024-08-01 ENCOUNTER — OFFICE VISIT (OUTPATIENT)
Dept: INTERNAL MEDICINE | Facility: CLINIC | Age: 29
End: 2024-08-01
Payer: COMMERCIAL

## 2024-08-01 ENCOUNTER — LAB (OUTPATIENT)
Dept: LAB | Facility: CLINIC | Age: 29
End: 2024-08-01
Payer: COMMERCIAL

## 2024-08-01 VITALS
BODY MASS INDEX: 28.12 KG/M2 | WEIGHT: 163.8 LBS | OXYGEN SATURATION: 97 % | HEART RATE: 69 BPM | DIASTOLIC BLOOD PRESSURE: 70 MMHG | SYSTOLIC BLOOD PRESSURE: 106 MMHG

## 2024-08-01 DIAGNOSIS — Z11.59 NEED FOR HEPATITIS C SCREENING TEST: ICD-10-CM

## 2024-08-01 DIAGNOSIS — Z83.49 FAMILY HISTORY OF THYROID DISORDER: ICD-10-CM

## 2024-08-01 DIAGNOSIS — Z13.21 ENCOUNTER FOR VITAMIN DEFICIENCY SCREENING: ICD-10-CM

## 2024-08-01 DIAGNOSIS — Z11.4 SCREENING FOR HIV (HUMAN IMMUNODEFICIENCY VIRUS): Primary | ICD-10-CM

## 2024-08-01 DIAGNOSIS — Z13.0 SCREENING FOR DEFICIENCY ANEMIA: ICD-10-CM

## 2024-08-01 DIAGNOSIS — Z83.3 FAMILY HISTORY OF DIABETES MELLITUS: ICD-10-CM

## 2024-08-01 DIAGNOSIS — F43.21 ADJUSTMENT DISORDER WITH DEPRESSED MOOD: ICD-10-CM

## 2024-08-01 DIAGNOSIS — Z12.4 CERVICAL CANCER SCREENING: ICD-10-CM

## 2024-08-01 DIAGNOSIS — F98.8 ATTENTION DEFICIT DISORDER, UNSPECIFIED HYPERACTIVITY PRESENCE: ICD-10-CM

## 2024-08-01 DIAGNOSIS — Z13.1 SCREENING FOR DIABETES MELLITUS: ICD-10-CM

## 2024-08-01 DIAGNOSIS — J30.81 ALLERGIC RHINITIS DUE TO ANIMALS: ICD-10-CM

## 2024-08-01 LAB
ALBUMIN SERPL BCG-MCNC: 4.6 G/DL (ref 3.5–5.2)
ALP SERPL-CCNC: 106 U/L (ref 40–150)
ALT SERPL W P-5'-P-CCNC: 16 U/L (ref 0–50)
ANION GAP SERPL CALCULATED.3IONS-SCNC: 10 MMOL/L (ref 7–15)
AST SERPL W P-5'-P-CCNC: 18 U/L (ref 0–45)
BILIRUB SERPL-MCNC: 0.6 MG/DL
BUN SERPL-MCNC: 7.6 MG/DL (ref 6–20)
CALCIUM SERPL-MCNC: 9.6 MG/DL (ref 8.8–10.4)
CHLORIDE SERPL-SCNC: 106 MMOL/L (ref 98–107)
CREAT SERPL-MCNC: 0.64 MG/DL (ref 0.51–0.95)
EGFRCR SERPLBLD CKD-EPI 2021: >90 ML/MIN/1.73M2
ERYTHROCYTE [DISTWIDTH] IN BLOOD BY AUTOMATED COUNT: 13.5 % (ref 10–15)
GLUCOSE SERPL-MCNC: 89 MG/DL (ref 70–99)
HCO3 SERPL-SCNC: 23 MMOL/L (ref 22–29)
HCT VFR BLD AUTO: 42.7 % (ref 35–47)
HGB BLD-MCNC: 13.8 G/DL (ref 11.7–15.7)
IRON BINDING CAPACITY (ROCHE): 458 UG/DL (ref 240–430)
IRON SATN MFR SERPL: 14 % (ref 15–46)
IRON SERPL-MCNC: 66 UG/DL (ref 37–145)
MCH RBC QN AUTO: 27.1 PG (ref 26.5–33)
MCHC RBC AUTO-ENTMCNC: 32.3 G/DL (ref 31.5–36.5)
MCV RBC AUTO: 84 FL (ref 78–100)
PLATELET # BLD AUTO: 363 10E3/UL (ref 150–450)
POTASSIUM SERPL-SCNC: 4.1 MMOL/L (ref 3.4–5.3)
PROT SERPL-MCNC: 7.8 G/DL (ref 6.4–8.3)
RBC # BLD AUTO: 5.1 10E6/UL (ref 3.8–5.2)
SODIUM SERPL-SCNC: 139 MMOL/L (ref 135–145)
TSH SERPL DL<=0.005 MIU/L-ACNC: 2.02 UIU/ML (ref 0.3–4.2)
VIT D+METAB SERPL-MCNC: 11 NG/ML (ref 20–50)
WBC # BLD AUTO: 8 10E3/UL (ref 4–11)

## 2024-08-01 PROCEDURE — 82306 VITAMIN D 25 HYDROXY: CPT | Performed by: NURSE PRACTITIONER

## 2024-08-01 PROCEDURE — 99395 PREV VISIT EST AGE 18-39: CPT | Performed by: NURSE PRACTITIONER

## 2024-08-01 PROCEDURE — 83550 IRON BINDING TEST: CPT | Performed by: PATHOLOGY

## 2024-08-01 PROCEDURE — 85027 COMPLETE CBC AUTOMATED: CPT | Performed by: PATHOLOGY

## 2024-08-01 PROCEDURE — 83540 ASSAY OF IRON: CPT | Performed by: PATHOLOGY

## 2024-08-01 PROCEDURE — 80053 COMPREHEN METABOLIC PANEL: CPT | Performed by: PATHOLOGY

## 2024-08-01 PROCEDURE — 36415 COLL VENOUS BLD VENIPUNCTURE: CPT | Performed by: PATHOLOGY

## 2024-08-01 PROCEDURE — 84443 ASSAY THYROID STIM HORMONE: CPT | Performed by: PATHOLOGY

## 2024-08-01 PROCEDURE — 99000 SPECIMEN HANDLING OFFICE-LAB: CPT | Performed by: PATHOLOGY

## 2024-08-01 RX ORDER — MONTELUKAST SODIUM 10 MG/1
10 TABLET ORAL AT BEDTIME
Qty: 90 TABLET | Refills: 3 | Status: SHIPPED | OUTPATIENT
Start: 2024-08-01

## 2024-08-01 RX ORDER — CITALOPRAM HYDROBROMIDE 10 MG/1
TABLET ORAL
Qty: 90 TABLET | Refills: 3 | Status: SHIPPED | OUTPATIENT
Start: 2024-08-01

## 2024-08-01 SDOH — HEALTH STABILITY: PHYSICAL HEALTH: ON AVERAGE, HOW MANY DAYS PER WEEK DO YOU ENGAGE IN MODERATE TO STRENUOUS EXERCISE (LIKE A BRISK WALK)?: 5 DAYS

## 2024-08-01 SDOH — HEALTH STABILITY: PHYSICAL HEALTH: ON AVERAGE, HOW MANY MINUTES DO YOU ENGAGE IN EXERCISE AT THIS LEVEL?: 30 MIN

## 2024-08-01 ASSESSMENT — SOCIAL DETERMINANTS OF HEALTH (SDOH): HOW OFTEN DO YOU GET TOGETHER WITH FRIENDS OR RELATIVES?: MORE THAN THREE TIMES A WEEK

## 2024-08-01 NOTE — PROGRESS NOTES
Preventive Care Visit  [facility]  LINDA Crenshaw CNP, Internal Medicine  Aug 1, 2024      Assessment & Plan       Cervical cancer screening  - Declines.    Adjustment disorder with depressed mood  - citalopram (CELEXA) 10 MG tablet; Take 0.5 tab at HS x 4 days, then 1.0 tab at night time.    Allergic rhinitis due to animals  - montelukast (SINGULAIR) 10 MG tablet; Take 1 tablet (10 mg) by mouth at bedtime    Attention deficit disorder, unspecified hyperactivity presence  - [facility]  Referral; Future    Encounter for vitamin deficiency screening  - Vitamin D Deficiency; Future    Screening for deficiency anemia  - CBC with platelets; Future  - Iron and iron binding capacity; Future    Screening for diabetes mellitus  - Comprehensive metabolic panel (BMP + Alb, Alk Phos, ALT, AST, Total. Bili, TP); Future    Family history of thyroid disorder  - TSH with free T4 reflex; Future    Family history of diabetes mellitus  - CMP    Recommended:  Influenza and new Covid booster in the Fall.      The longitudinal plan of care for the diagnosis(es)/condition(s) as documented were addressed during this visit. Due to the added complexity in care, I will continue to support Analilia in the subsequent management and with ongoing continuity of care.    . I spent a total of 53  minutes in the care of this pt during today's office visit. This time includes reviewing the patient's chart and prior history, obtaining a history, performing an examination and evaluation and counseling the patient. This time also includes ordering medications or tests necessary in addition to communication to other member's of the patient's health care team. Time spent in documentation and care coordination is included.     Shante MCKEON, CNP        Subjective   Analilia is a 29 year old, presenting for the following:  Physical (Discuss diagnosis./Medication review/refill./Low  "iron and vit D. Need blood test.)        8/1/2024    12:20 PM   Additional Questions   Roomed by KTR   Accompanied by Bridgette(Sister)         8/1/2024   Forms   Any forms needing to be completed Yes           Analilia Mora is a 29 year old female who presents with her younger sister for a \"check-up.  She wants to address issues of depression, anxiety and possibly attention deficit D/O. She states she has had issues for a few years with attention, completing tasks. She feels depressed, isolates, avoids social situations, low energy,wakes frequently,cries sometimes. Her sister has a hx of depression and anxiety, and agoraphobia which was improved with counseling.  Her mother also has depression and takes medication for anxiety. She had issues with agoraphobia.  She has two younger brothers and does not know if they have symptoms. They do not know the health history of their father.   She denies panic attacks. Does have arm ad leg \"tingling\" when she gets anxious at times/     ROS:has not been to a dentist in several years. No moutn pain.  Eyes: she wears glasses. No vision changes.  She was using certrizine for allergies to her dog and cat but ran out.  Had a topical cream which stopped working. She develops pruritic skin eruptions on her arms.   Heart: normal  Lungs: normal  GI: normal  : regular periods.  Takes 400 mg of ibuprofen for menstrual cramps. She is not sexually active, has never been, and declines a pelvic exam today.   No MS complaints.  No smoking  No ETOH    She works at [employer]              Patient Active Problem List   Diagnosis    Atopic dermatitis    Recurrent herpes simplex on low back    Allergy to dog dander    Allergic contact dermatitis due to animal (cat) (dog) dander           8/1/2024   General Health   How would you rate your overall physical health? Good   Feel stress (tense, anxious, or unable to sleep) To some extent      (!) STRESS CONCERN      8/1/2024   Nutrition   Three " or more servings of calcium each day? (!) NO   Diet: Regular (no restrictions)   How many servings of fruit and vegetables per day? (!) 0-1   How many sweetened beverages each day? (!) 2            8/1/2024   Exercise   Days per week of moderate/strenous exercise 5 days   Average minutes spent exercising at this level 30 min            8/1/2024   Social Factors   Frequency of gathering with friends or relatives More than three times a week   Worry food won't last until get money to buy more No   Food not last or not have enough money for food? No   Do you have housing? (Housing is defined as stable permanent housing and does not include staying ouside in a car, in a tent, in an abandoned building, in an overnight shelter, or couch-surfing.) Yes   Are you worried about losing your housing? No   Lack of transportation? No   Unable to get utilities (heat,electricity)? No            8/1/2024   Dental   Dentist two times every year? (!) NO            8/1/2024   TB Screening   Were you born outside of the [place]? No            Today's PHQ-2 Score:       8/1/2024    12:25 PM   PHQ-2 ( 1999 [employer])   Q1: Little interest or pleasure in doing things 1   Q2: Feeling down, depressed or hopeless 1   PHQ-2 Score 2   Q1: Little interest or pleasure in doing things Several days   Q2: Feeling down, depressed or hopeless Several days   PHQ-2 Score 2           8/1/2024   Substance Use   Alcohol more than 3/day or more than 7/wk No   Do you use any other substances recreationally? No        Social History     Tobacco Use    Smoking status: Never    Smokeless tobacco: Never    Tobacco comments:     not exposed to 2nd hand smoke   Substance Use Topics    Alcohol use: No    Drug use: No                8/1/2024   One time HIV Screening   Previous HIV test? No          8/1/2024   STI Screening   New sexual partner(s) since last STI/HIV test? No        History of abnormal Pap smear: Has never had a pap smear.             8/1/2024  "  Contraception/Family Planning   Questions about contraception or family planning No           Reviewed and updated as needed this visit by Provider                    History reviewed. No pertinent past medical history.  Past Surgical History:   Procedure Laterality Date    NO HISTORY OF SURGERY       Family History   Problem Relation Age of Onset    Thyroid Disease Mother     Anxiety Disorder Mother     Mental Illness Mother     Hypertension Mother     Diabetes Mother     Atrial fibrillation Mother     Diabetes Sister     Neurologic Disorder Brother     Allergies Other         aunt    Alcohol/Drug Other         aunt, uncle    Glaucoma No family hx of     Macular Degeneration No family hx of          Objective    Exam  /70 (BP Location: Right arm, Patient Position: Sitting, Cuff Size: Adult Regular)   Pulse 69   Wt 74.3 kg (163 lb 12.8 oz)   SpO2 97%   BMI 28.12 kg/m     Estimated body mass index is 28.12 kg/m  as calculated from the following:    Height as of 9/26/22: 1.626 m (5' 4\").    Weight as of this encounter: 74.3 kg (163 lb 12.8 oz).    Physical Exam  GENERAL: alert and no distress  EYES: Eyes grossly normal to inspection, PERRL and conjunctivae and sclerae normal  HENT: ear canals and TM's normal, nose and mouth without ulcers or lesions  NECK: no adenopathy, no asymmetry, masses, or scars  RESP: lungs clear to auscultation - no rales, rhonchi or wheezes  CV: regular rate and rhythm, normal S1 S2, no S3 or S4, no murmur, click or rub, no peripheral edema  ABDOMEN: soft, nontender, no hepatosplenomegaly, no masses and bowel sounds normal  MS: no gross musculoskeletal defects noted, no edema  SKIN: few scattered red, slightly raised lesions on inner arms.  NEURO: mentation intact and speech normal  PSYCH: mentation appears normal, affect normal/bright  LYMPH: no cervical, supraclavicular, axillary, or inguinal adenopathy        Signed Electronically by: LINDA Crenshaw CNP    "

## 2024-08-03 DIAGNOSIS — E55.9 VITAMIN D DEFICIENCY: Primary | ICD-10-CM

## 2024-08-03 RX ORDER — ERGOCALCIFEROL 1.25 MG/1
50000 CAPSULE, LIQUID FILLED ORAL WEEKLY
Qty: 8 CAPSULE | Refills: 0 | Status: SHIPPED | OUTPATIENT
Start: 2024-08-03

## 2024-08-10 ENCOUNTER — HEALTH MAINTENANCE LETTER (OUTPATIENT)
Age: 29
End: 2024-08-10

## 2025-08-16 ENCOUNTER — HEALTH MAINTENANCE LETTER (OUTPATIENT)
Age: 30
End: 2025-08-16

## 2025-08-28 ENCOUNTER — OFFICE VISIT (OUTPATIENT)
Dept: OPHTHALMOLOGY | Facility: CLINIC | Age: 30
End: 2025-08-28
Payer: COMMERCIAL

## 2025-08-28 DIAGNOSIS — H35.411 LATTICE DEGENERATION, RIGHT EYE: ICD-10-CM

## 2025-08-28 DIAGNOSIS — H52.31 ANISOMETROPIA: ICD-10-CM

## 2025-08-28 DIAGNOSIS — H52.13 MYOPIA OF BOTH EYES: Primary | ICD-10-CM

## 2025-08-28 ASSESSMENT — CONF VISUAL FIELD
OS_INFERIOR_TEMPORAL_RESTRICTION: 0
OS_NORMAL: 1
OS_INFERIOR_NASAL_RESTRICTION: 0
OS_SUPERIOR_TEMPORAL_RESTRICTION: 0
OD_NORMAL: 1
OD_SUPERIOR_NASAL_RESTRICTION: 0
OD_INFERIOR_TEMPORAL_RESTRICTION: 0
METHOD: COUNTING FINGERS
OS_SUPERIOR_NASAL_RESTRICTION: 0
OD_INFERIOR_NASAL_RESTRICTION: 0
OD_SUPERIOR_TEMPORAL_RESTRICTION: 0

## 2025-08-28 ASSESSMENT — REFRACTION_MANIFEST
OD_SPHERE: -2.50
OS_SPHERE: -0.75
OS_CYLINDER: +0.25
OD_CYLINDER: SPHERE
OS_AXIS: 105

## 2025-08-28 ASSESSMENT — SLIT LAMP EXAM - LIDS
COMMENTS: NORMAL
COMMENTS: NORMAL

## 2025-08-28 ASSESSMENT — VISUAL ACUITY
OS_CC: 20/25
OD_CC: 20/25
METHOD: SNELLEN - LINEAR
OS_CC+: -1
OD_CC: J1+
OS_CC: J1+
CORRECTION_TYPE: GLASSES

## 2025-08-28 ASSESSMENT — TONOMETRY
IOP_METHOD: ICARE
OS_IOP_MMHG: 7
OD_IOP_MMHG: 10

## 2025-08-28 ASSESSMENT — REFRACTION_WEARINGRX
OD_SPHERE: -3.00
OS_SPHERE: -0.50
OS_CYLINDER: +0.50
OS_AXIS: 115
OD_CYLINDER: SPHERE

## 2025-08-28 ASSESSMENT — EXTERNAL EXAM - LEFT EYE: OS_EXAM: NORMAL

## 2025-08-28 ASSESSMENT — EXTERNAL EXAM - RIGHT EYE: OD_EXAM: NORMAL

## 2025-08-28 ASSESSMENT — CUP TO DISC RATIO
OS_RATIO: 0.5
OD_RATIO: 0.5